# Patient Record
Sex: FEMALE | Race: WHITE
[De-identification: names, ages, dates, MRNs, and addresses within clinical notes are randomized per-mention and may not be internally consistent; named-entity substitution may affect disease eponyms.]

---

## 2017-08-16 ENCOUNTER — HOSPITAL ENCOUNTER (INPATIENT)
Dept: HOSPITAL 74 - JER | Age: 72
LOS: 7 days | Discharge: HOME HEALTH | DRG: 746 | End: 2017-08-23
Attending: INTERNAL MEDICINE | Admitting: INTERNAL MEDICINE
Payer: COMMERCIAL

## 2017-08-16 VITALS — BODY MASS INDEX: 28.8 KG/M2

## 2017-08-16 DIAGNOSIS — N17.9: ICD-10-CM

## 2017-08-16 DIAGNOSIS — G40.89: ICD-10-CM

## 2017-08-16 DIAGNOSIS — E11.65: ICD-10-CM

## 2017-08-16 DIAGNOSIS — B95.4: ICD-10-CM

## 2017-08-16 DIAGNOSIS — I25.10: ICD-10-CM

## 2017-08-16 DIAGNOSIS — N76.4: Primary | ICD-10-CM

## 2017-08-16 DIAGNOSIS — D64.9: ICD-10-CM

## 2017-08-16 DIAGNOSIS — E78.00: ICD-10-CM

## 2017-08-16 DIAGNOSIS — Z87.891: ICD-10-CM

## 2017-08-16 DIAGNOSIS — I11.0: ICD-10-CM

## 2017-08-16 DIAGNOSIS — I50.22: ICD-10-CM

## 2017-08-16 DIAGNOSIS — Z95.1: ICD-10-CM

## 2017-08-16 DIAGNOSIS — Z79.4: ICD-10-CM

## 2017-08-16 DIAGNOSIS — I25.5: ICD-10-CM

## 2017-08-16 LAB
ALBUMIN SERPL-MCNC: 3.5 G/DL (ref 3.4–5)
ALP SERPL-CCNC: 66 U/L (ref 45–117)
ALT SERPL-CCNC: 16 U/L (ref 12–78)
ANION GAP SERPL CALC-SCNC: 8 MMOL/L (ref 8–16)
AST SERPL-CCNC: 5 U/L (ref 15–37)
BASOPHILS # BLD: 0.2 % (ref 0–2)
BILIRUB SERPL-MCNC: 1 MG/DL (ref 0.2–1)
CALCIUM SERPL-MCNC: 10 MG/DL (ref 8.5–10.1)
CO2 SERPL-SCNC: 25 MMOL/L (ref 21–32)
CREAT SERPL-MCNC: 1.4 MG/DL (ref 0.55–1.02)
DEPRECATED RDW RBC AUTO: 13.4 % (ref 11.6–15.6)
EOSINOPHIL # BLD: 0.4 % (ref 0–4.5)
GLUCOSE SERPL-MCNC: 312 MG/DL (ref 74–106)
INR BLD: 1.27 (ref 0.82–1.09)
MCH RBC QN AUTO: 30.4 PG (ref 25.7–33.7)
MCHC RBC AUTO-ENTMCNC: 33.5 G/DL (ref 32–36)
MCV RBC: 90.6 FL (ref 80–96)
NEUTROPHILS # BLD: 80.6 % (ref 42.8–82.8)
PLATELET # BLD AUTO: 161 K/MM3 (ref 134–434)
PMV BLD: 8.4 FL (ref 7.5–11.1)
PROT SERPL-MCNC: 7.3 G/DL (ref 6.4–8.2)
PT PNL PPP: 14 SEC (ref 9.98–11.88)
WBC # BLD AUTO: 12.1 K/MM3 (ref 4–10)

## 2017-08-16 RX ADMIN — CARVEDILOL SCH MG: 12.5 TABLET, FILM COATED ORAL at 23:49

## 2017-08-16 NOTE — PDOC
*Physical Exam





- Vital Signs


 Last Vital Signs











Temp Pulse Resp BP Pulse Ox


 


 98.0 F   86   18   111/59   97 


 


 08/16/17 16:01  08/16/17 17:43  08/16/17 17:43  08/16/17 17:43  08/16/17 17:43














**Heart Score/ECG Review


  ** #1


ECG reviewed & interpreted by me at: 18:52


General ECG Interpretation: Sinus Rhythm, Normal Rate, Normal Intervals





08/16/17 18:52


non specific t wave abnormalities








ED Treatment Course





- LABORATORY


CBC & Chemistry Diagram: 


 08/16/17 18:35





 08/16/17 18:35





Medical Decision Making





- Medical Decision Making





08/16/17 18:39


This is a 72-year-old female with a history of diabetes who presents emergency 

department from her GYNs office due to labial abscess.


No fevers, no chills.


Area is very tender.


Plan is for admission for operative drainage tomorrow.








Pt seen by Midlevel Provider under my direct supervision


Ancillary studies reviewed





I agree with plan as outlined by Midlevel Provider





*DC/Admit/Observation/Transfer


Diagnosis at time of Disposition: 


 Abscess of labia majora





Diabetes


Qualifiers:


 Diabetes mellitus type: other specified (including TRACY) Diabetes mellitus 

complication status: with skin complications Diabetes mellitus complication 

detail: with other skin complication Diabetes mellitus long term insulin use: 

with long term use Qualified Code(s): E13.628 - Other specified diabetes 

mellitus with other skin complications





Coronary artery disease


Qualifiers:


 Coronary Disease-Associated Artery/Lesion type: unspecified vessel or lesion 

type Native vs. transplanted heart: native heart Associated angina: without 

angina Qualified Code(s): I25.10 - Atherosclerotic heart disease of native 

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





- Discharge Dispostion


Condition at time of disposition: Stable





- Referrals


Referrals: 


Yuly Beck MD [Primary Care Provider] - 





- Patient Instructions





- Post Discharge Activity

## 2017-08-16 NOTE — PDOC
History of Present Illness





- General


Chief Complaint: Wound


Stated Complaint: CYST (PCP SENT)


Time Seen by Provider: 08/16/17 17:28


History Source: Patient


Exam Limitations: No Limitations





- History of Present Illness


Initial Comments: 


08/16/17 17:53


Patient is a 72-year-old female with past medical history of insulin-dependent 

diabetes, HTN, HLD who presents to the emergency department today complaining 

of pain in her genital region. Patient states she was seen by Dr. Larry today 

in the clinic and was diagnosed with an abscess to her labia. She was sent over 

to the emergency department for further evaluation and workup of her abscess. 

She states her symptoms started yesterday. She initially noticed a small lump 

and that it hurt to sit. She states that the abscess grew overnight and became 

much larger. She states that the pain is in an 8 out of 10. Denies fevers, 

chills, weakness, lethargy, chest pain, shortness of breath, cough, edema, 

palpitations, nausea, vomiting and diarrhea. Patient states that she hasn't 

urinated since she noticed the abscess because she was afraid it was going to 

hurt.











Past History





- Travel


Traveled outside of the country in the last 30 days: No


Close contact w/someone who was outside of country & ill: No





- Past Medical History


Allergies/Adverse Reactions: 


 Allergies











Allergy/AdvReac Type Severity Reaction Status Date / Time


 


No Known Allergies Allergy   Verified 08/16/17 16:05











Home Medications: 


Ambulatory Orders





Atorvastatin Ca [Lipitor] 40 mg PO HS 07/22/16 


Carvedilol 12.5 mg PO BID 07/22/16 


Insulin Aspart [Novolog] 20 unit SQ TID 07/22/16 


Insulin Glargine,Hum.rec.anlog [Lantus Solostar PEN (NF)] 32 units SQ HS 07/22/ 16 


Ramipril 5 mg PO DAILY 07/22/16 


Spironolactone 25 mg PO DAILY 07/22/16 








Anemia: No


Asthma: No


Cancer: No


Cardiac Disorders: Yes


CVA: No


COPD: No


CHF: No


Dementia: No


Diabetes: Yes (IDDM)


GI Disorders: No


 Disorders: No


HTN: Yes


Hypercholesterolemia: Yes


Liver Disease: No


Seizures: Yes (possible with brain aneurysm (1992))


Thyroid Disease: No





- Surgical History


Cardiac Surgery: Yes (triple bypass 2016)


Neurologic Surgery: Yes (CLIPPING OF ANEURYSM 1992)





- Psycho/Social/Smoking Cessation Hx


Suicidal Ideation: No


Smoking Status: No


Smoking History: Former smoker


Have you smoked in the past 12 months: No


Number of Cigarettes Smoked Daily: 60


If you are a former smoker, when did you quit?: 1972


Information on smoking cessation initiated: No


Hx Alcohol Use: No


Drug/Substance Use Hx: No





**Review of Systems





- Review of Systems


Constitutional: No: Chills, Fever, Malaise, Weakness


Respiratory: No: Cough, Shortness of Breath, Wheezing


Cardiac (ROS): No: Chest Pain, Edema, Lightheadedness, Palpitations, Chest 

Tightness


ABD/GI: No: Diarrhea, Nausea, Vomiting


: Yes: Pain (L labia majora)


Integumentary: Yes: Erythema (L labia), Other (abscess to the L labia measuring 

2cm wide x 4cm long)


Neurological: No: Numbness, Tingling, Weakness


All Other Systems: Reviewed and Negative





*Physical Exam





- Vital Signs


 Last Vital Signs











Temp Pulse Resp BP Pulse Ox


 


 98.0 F   91 H  16   95/41   98 


 


 08/16/17 16:01  08/16/17 16:01  08/16/17 16:01  08/16/17 16:01  08/16/17 16:01














- Physical Exam


Comments: 





08/16/17 17:54


GENERAL:


Well developed, well nourished. AAOx3. No acute distress, laying on hospital bed

, breathing easily.


HEENT:


Normocephalic, atraumatic. PERRLA, EOMI. No conjunctival pallor. Sclera are non-

icteric. Moist mucous membranes. Oropharynx is clear.


NECK: 


Supple. Full ROM. No JVD. Carotid pulses 2+ and symmetric, without bruits. No 

thyromegaly. No lymphadenopathy.


CARDIOVASCULAR:


Regular rate and rhythm. No murmurs, rubs, or gallops. Distal pulses are 2+ and 

symmetric. 


PULMONARY: 


No evidence of respiratory distress. Lungs clear to auscultation bilaterally. 

No wheezing, rales or rhonchi.


ABDOMINAL:


Soft. Non-tender. Non-distended. No rebound or guarding. No organomegaly. 

Normoactive bowel sounds. 


MUSCULOSKELETAL 


Normal range of motion at all joints. No bony deformities or tenderness. No CVA 

tenderness.


EXTREMITIES: 


No cyanosis. No clubbing. No edema. No calf tenderness.


SKIN: 


Warm and dry. Normal capillary refill. No rashes. No jaundice. 


NEUROLOGICAL: 


Alert, awake, appropriate. Cranial nerves 2-12 intact. No deficits to light 

touch and temperature in face, upper extremities and lower extremities. No 

motor deficits in the in face, upper extremities and lower extremities. 

Normoreflexic in the upper and lower extremities. Normal speech. Toes are down-

going bilaterally. Gait is normal without ataxia.


PSYCHIATRIC: 


Cooperative. Good eye contact. Appropriate mood and affect.


:


L Labia with indurated abscess and surrounding erythema measuring 2cm [id number] 

long. TTP, no obvious fluctuant mass. R labia is normal appearing, no other 

rashes or lesions noted on the external genitalia 





Medical Decision Making





- Medical Decision Making


08/16/17 16:56





Patient is a 72-year-old female with past medical history of insulin-dependent 

diabetes, HTN, HLD who presents to the emergency department today complaining 

of pain in her genital region. On exam there is a cellulitic/abscess region on 

her left labia measuring approximately 2 cm x 4 cm. Patient saw Dr. Larry 

today in the office. Spoke with Dr. Chavez who is covering for Dr. Wheeler. 

She states that he is requesting that she be admitted for IV antibiotics and OR 

incision and drainage of the abscess given her comorbidities and location of 

abscess. Requesting that she be admitted to Dr. Beck her primary care doctor. 

Will page at this time. We will also order basic labs that are needed for the 

OR and IV antibiotics.





1. CBC, CMP, PT/INR, Type and screen, blood cultures x2, UA, UC


2. IV Vancomycin, IV zosyn


3. Admit to Med/Surg





08/16/17 17:11


Case discussed with Dr. Beck. He accepts the patient.





*DC/Admit/Observation/Transfer


Diagnosis at time of Disposition: 


 Abscess of labia majora





Diabetes


Qualifiers:


 Diabetes mellitus type: other specified (including TRACY) Diabetes mellitus 

complication status: with skin complications Diabetes mellitus complication 

detail: with other skin complication Diabetes mellitus long term insulin use: 

with long term use Qualified Code(s): E13.628 - Other specified diabetes 

mellitus with other skin complications





Coronary artery disease


Qualifiers:


 Coronary Disease-Associated Artery/Lesion type: unspecified vessel or lesion 

type Native vs. transplanted heart: native heart Associated angina: without 

angina Qualified Code(s): I25.10 - Atherosclerotic heart disease of native 

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





- Discharge Dispostion


Condition at time of disposition: Stable


Admit: Yes





- Referrals


Referrals: 


Yuly Beck MD [Primary Care Provider] -

## 2017-08-17 LAB
APPEARANCE UR: CLEAR
BILIRUB UR STRIP.AUTO-MCNC: NEGATIVE MG/DL
COLOR UR: (no result)
KETONES UR QL STRIP: NEGATIVE
LEUKOCYTE ESTERASE UR QL STRIP.AUTO: NEGATIVE
NITRITE UR QL STRIP: NEGATIVE
PH UR: 5 [PH] (ref 5–8)
PROT UR QL STRIP: (no result)
PROT UR QL STRIP: NEGATIVE
RBC # UR STRIP: NEGATIVE /UL
SP GR UR: <= 1.005 (ref 1–1.02)
UROBILINOGEN UR STRIP-MCNC: 0.2 MG/DL (ref 0.2–1)

## 2017-08-17 PROCEDURE — 0U9MXZZ DRAINAGE OF VULVA, EXTERNAL APPROACH: ICD-10-PCS | Performed by: OBSTETRICS & GYNECOLOGY

## 2017-08-17 RX ADMIN — CARVEDILOL SCH: 12.5 TABLET, FILM COATED ORAL at 09:46

## 2017-08-17 RX ADMIN — INSULIN ASPART SCH UNITS: 100 INJECTION, SOLUTION INTRAVENOUS; SUBCUTANEOUS at 01:02

## 2017-08-17 RX ADMIN — INSULIN ASPART SCH: 100 INJECTION, SOLUTION INTRAVENOUS; SUBCUTANEOUS at 06:48

## 2017-08-17 RX ADMIN — ACETAMINOPHEN PRN MG: 325 TABLET ORAL at 22:25

## 2017-08-17 RX ADMIN — CARVEDILOL SCH MG: 12.5 TABLET, FILM COATED ORAL at 21:42

## 2017-08-17 RX ADMIN — INSULIN ASPART SCH UNITS: 100 INJECTION, SOLUTION INTRAVENOUS; SUBCUTANEOUS at 21:41

## 2017-08-17 RX ADMIN — PIPERACILLIN SODIUM,TAZOBACTAM SODIUM SCH MLS/HR: 3; .375 INJECTION, POWDER, FOR SOLUTION INTRAVENOUS at 18:36

## 2017-08-17 RX ADMIN — INSULIN ASPART SCH: 100 INJECTION, SOLUTION INTRAVENOUS; SUBCUTANEOUS at 11:52

## 2017-08-17 NOTE — EKG
Test Reason : 

Blood Pressure : ***/*** mmHG

Vent. Rate : 082 BPM     Atrial Rate : 082 BPM

   P-R Int : 162 ms          QRS Dur : 086 ms

    QT Int : 346 ms       P-R-T Axes : -08 -17 069 degrees

   QTc Int : 404 ms

 

NORMAL SINUS RHYTHM

NONSPECIFIC T WAVE ABNORMALITY

ABNORMAL ECG

WHEN COMPARED WITH ECG OF 23-JUL-2016 22:13,

QT HAS SHORTENED

Confirmed by KEYONA BARNETT MD ([id number]) on 8/17/2017 2:55:38 PM

 

Referred By:             Confirmed By:KEYONA BARNETT MD

## 2017-08-17 NOTE — CON.OBG
Consult


Consult Specialty:: gyn


Reason for Consultation:: DR Beck





- History of Present Illness


Chief Complaint: vulvar pain


History of Present Illness: 


73 yo f with hx of DM, ASHD , c/o LT vulvar pain for 4 days , admitted for 

large LT vulvar abscess, no discharge, no fever





- History Source


History Provided By: Patient


Limitations to Obtaining History: No Limitations





- Past Medical History


Cardio/Vascular: Yes: CAD, CHF, HTN


...Pregnant: No





- Past Surgical History


Past Surgical History: Yes: Bypass





- Alcohol/Substance Use


Hx Alcohol Use: No





- Smoking History


Smoking history: Former smoker


Have you smoked in the past 12 months: No


Aproximately how many cigarettes per day: 60


If you are a former smoker, when did you quit?: 1972





- Social History


Usual Living Arrangement: With Spouse





Home Medications





- Allergies


Allergies/Adverse Reactions: 


 Allergies











Allergy/AdvReac Type Severity Reaction Status Date / Time


 


No Known Allergies Allergy   Verified 08/16/17 16:05














- Home Medications


Home Medications: 


Ambulatory Orders





Atorvastatin Ca [Lipitor] 40 mg PO HS 07/22/16 


Carvedilol 12.5 mg PO BID 07/22/16 


Insulin Aspart [Novolog] 14 unit SQ TID 07/22/16 


Insulin Glargine,Hum.rec.anlog [Lantus Solostar PEN (NF)] 32 units SQ HS 07/22/ 16 


Ramipril 5 mg PO DAILY 07/22/16 


Spironolactone 25 mg PO DAILY 07/22/16 











Review of Systems





- Review of Systems


Constitutional: reports: Weakness


HENT: reports: No Symptoms


Neck: reports: No Symptoms


Cardiovascular: reports: No Symptoms


Respiratory: reports: No Symptoms


Gastrointestinal: reports: No Symptoms


Genitourinary: reports: Pain


Breasts: reports: No Symptoms Reported


Musculoskeletal: reports: No Symptoms


Neurological: reports: No Symptoms


Endocrine: reports: No Symptoms


Hematology/Lymphatic: reports: No Symptoms


Pain Intensity: 8





Physical Exam-GYN


Vital Signs: 


 Vital Signs











Temperature  98.4 F   08/17/17 18:30


 


Pulse Rate  75   08/17/17 18:30


 


Respiratory Rate  20   08/17/17 18:30


 


Blood Pressure  130/60   08/17/17 18:30


 


O2 Sat by Pulse Oximetry (%)  100   08/17/17 17:45











Constitutional: Yes: Well Nourished, No Distress, Calm


Eyes: Yes: WNL, Conjunctiva Clear, EOM Intact


HENT: Yes: WNL, Atraumatic, Normocephalic


Neck: Yes: WNL, Supple, Trachea Midline


Cardiovascular: Yes: WNL, Regular Rate and Rhythm


Respiratory: Yes: WNL, Regular, CTA Bilaterally


Gastrointestinal: Yes: WNL


Renal/: Yes: WNL


External Genitalia: Yes: Other (large LT labia majora abscess 6 cm, red, with 

cellulitis and tenderness)


Vaginal Exam: Yes: Normal


Breast(s): Yes: WNL


Musculoskeletal: Yes: WNL


Extremities: Yes: WNL


Integumentary: Yes: WNL


Neurological: Yes: WNL, Alert, Oriented


...Motor Strength: WNL


Psychiatric: Yes: WNL, Alert, Oriented


Labs: 


 CBC, BMP





 08/16/17 18:35 





 08/16/17 23:38 











Problem List





- Problems


(1) Abscess of left genital labia


Code(s): N76.4 - ABSCESS OF VULVA








Assessment/Plan


iv antibiotics, I/D of abscess , rba discussed

## 2017-08-17 NOTE — CONSULT
Consult


Consult Specialty:: infectious diseases


Referred by:: 


Reason for Consultation:: labial abscess





- History of Present Illness


Chief Complaint: pain and swelling of the left labia


History of Present Illness: 


This is a 72-year-old female with a history of diabetes who is admitted for 

labial abscess.


patient had the abscess before


patient was evalauted by gyn and the plan is to do surgery today


patient c/o sever pain at the site


spoke with the family and her sugars are not in control





- History Source


History Provided By: Patient, Family Member





- Past Medical History


Cardio/Vascular: Yes: CAD, CHF, HTN


...Pregnant: No





- Alcohol/Substance Use


Hx Alcohol Use: No





- Smoking History


Smoking history: Former smoker


Have you smoked in the past 12 months: No


Aproximately how many cigarettes per day: 60


If you are a former smoker, when did you quit?: 1972





Home Medications





- Allergies


Allergies/Adverse Reactions: 


 Allergies











Allergy/AdvReac Type Severity Reaction Status Date / Time


 


No Known Allergies Allergy   Verified 08/16/17 16:05














- Home Medications


Home Medications: 


Ambulatory Orders





Atorvastatin Ca [Lipitor] 40 mg PO HS 07/22/16 


Carvedilol 12.5 mg PO BID 07/22/16 


Insulin Aspart [Novolog] 14 unit SQ TID 07/22/16 


Insulin Glargine,Hum.rec.anlog [Lantus Solostar PEN (NF)] 32 units SQ HS 07/22/ 16 


Ramipril 5 mg PO DAILY 07/22/16 


Spironolactone 25 mg PO DAILY 07/22/16 











Review of Systems





- Review of Systems


Constitutional: reports: No Symptoms


Eyes: reports: No Symptoms


HENT: reports: No Symptoms


Neck: reports: No Symptoms


Cardiovascular: reports: No Symptoms


Respiratory: reports: No Symptoms


Gastrointestinal: reports: No Symptoms


Genitourinary: reports: No Symptoms


Musculoskeletal: reports: No Symptoms


Integumentary: reports: Erythema, Wound


Neurological: reports: No Symptoms


Endocrine: reports: No Symptoms


Hematology/Lymphatic: reports: No Symptoms


Psychiatric: reports: No Symptoms





Physical Exam


Vital Signs: 


 Vital Signs











Temperature  97.8 F   08/17/17 10:00


 


Pulse Rate  74   08/17/17 10:00


 


Respiratory Rate  18   08/17/17 10:00


 


Blood Pressure  100/40   08/17/17 10:00


 


O2 Sat by Pulse Oximetry (%)  99   08/16/17 21:00











Constitutional: Yes: Calm, Mild Distress


Eyes: Yes: Conjunctiva Clear


HENT: Yes: Atraumatic


Neck: Yes: Supple


Cardiovascular: Yes: Regular Rate and Rhythm


Respiratory: Yes: Regular, CTA Bilaterally


Gastrointestinal: Yes: Normal Bowel Sounds, Soft


Renal/: Yes: Other (left labia tenderness swelling)


Musculoskeletal: Yes: WNL


Extremities: Yes: WNL


Neurological: Yes: Alert, Oriented


Psychiatric: Yes: Alert, Oriented


Labs: 


 CBC, BMP





 08/16/17 18:35 





 08/16/17 23:38 











Imaging





- Results


Chest X-ray: Report Reviewed, Image Reviewed





Assessment/Plan


patient evaluated


patient has labial abscess





left labial abscess


dm





plan


surgery


will continue zosyn


once surgery is done


will see what cx show


diabetes management

## 2017-08-17 NOTE — HP
DATE OF ADMISSION:  08/16/2017

 

DATE OF DICTATION:  08/17/2017

 

HISTORY OF PRESENT ILLNESS:  This is a 72-year-old female known to have hypertension

and diabetes not under good control all the time, now presenting to the emergency

room with complaints of pain in the vulvar area.  It was diagnosed that patient had

labial abscess on the left side evaluated by her gynecologist, Dr. Fernandes, advised

incision and drainage in the operating room.  So, she got admitted with a diagnosis

of uncontrolled diabetes and abscess in the vulvar area of the vagina.  

 

MEDICATIONS:  She is on insulin Lantus 32 units a day and also on regular insulin 3

times a day depending upon the blood sugar.  

 

SOCIAL HISTORY:  She is not a smoker.  No alcohol abuse.  She is still working as a

[employer].  

 

ALLERGIES:  No known allergies.  

 

PHYSICAL EXAMINATION:

Vital signs:  Today, her blood pressure is 110/70, pulse 72, respirations 20,

temperature 100.2.  

HEENT:  Unremarkable.  

Neck:  supple.

Lungs:  Clear.  

Breasts:  No masses.

Heart:  S1, S2 normal.  No S3, S4.  

Abdomen:  Soft, nontender.  On the vaginal area, there is an abscess with induration

on the left labia.  

Extremities:  Legs no edema.  

Neurologic:  Grossly normal.  

 

LABORATORIES:  WBC 12, hemoglobin 11, hematocrit 34, platelets 161.  Chemistry: 

Sodium 133, potassium 4.7, BUN 43, creatinine 1.4, blood sugar 312 at the time of

admission and this morning is 191.  

 

Chest x-ray showed no acute changes, prominent mediastinum.  EKG to be evaluated.  

 

IMPRESSION:  Labial abscess, uncontrolled diabetes.  

 

PLAN:  GYN consult with Dr. Fernandes to do incision and drainage, and ID consult , Dr. Abbott, for antibiotics.  

 

 

ETHAN [id number]

DD: 08/17/2017 08:57

DT: 08/17/2017 11:04

Job #:  [id number]

## 2017-08-18 LAB
DEPRECATED RDW RBC AUTO: 13.2 % (ref 11.6–15.6)
MCH RBC QN AUTO: 29.9 PG (ref 25.7–33.7)
MCHC RBC AUTO-ENTMCNC: 33.6 G/DL (ref 32–36)
MCV RBC: 89 FL (ref 80–96)
PLATELET # BLD AUTO: 142 K/MM3 (ref 134–434)
PMV BLD: 8.3 FL (ref 7.5–11.1)
WBC # BLD AUTO: 13.8 K/MM3 (ref 4–10)

## 2017-08-18 RX ADMIN — PIPERACILLIN SODIUM,TAZOBACTAM SODIUM SCH MLS/HR: 3; .375 INJECTION, POWDER, FOR SOLUTION INTRAVENOUS at 18:25

## 2017-08-18 RX ADMIN — INSULIN ASPART SCH: 100 INJECTION, SOLUTION INTRAVENOUS; SUBCUTANEOUS at 06:02

## 2017-08-18 RX ADMIN — INSULIN ASPART SCH UNITS: 100 INJECTION, SOLUTION INTRAVENOUS; SUBCUTANEOUS at 11:20

## 2017-08-18 RX ADMIN — POTASSIUM CHLORIDE AND SODIUM CHLORIDE SCH MLS/HR: 900; 150 INJECTION, SOLUTION INTRAVENOUS at 15:06

## 2017-08-18 RX ADMIN — RAMIPRIL SCH: 5 CAPSULE ORAL at 09:49

## 2017-08-18 RX ADMIN — CARVEDILOL SCH: 12.5 TABLET, FILM COATED ORAL at 09:50

## 2017-08-18 RX ADMIN — INSULIN ASPART SCH UNITS: 100 INJECTION, SOLUTION INTRAVENOUS; SUBCUTANEOUS at 18:25

## 2017-08-18 RX ADMIN — CLINDAMYCIN HYDROCHLORIDE SCH MG: 150 CAPSULE ORAL at 18:25

## 2017-08-18 RX ADMIN — CARVEDILOL SCH MG: 12.5 TABLET, FILM COATED ORAL at 21:24

## 2017-08-18 RX ADMIN — PIPERACILLIN SODIUM,TAZOBACTAM SODIUM SCH MLS/HR: 3; .375 INJECTION, POWDER, FOR SOLUTION INTRAVENOUS at 09:48

## 2017-08-18 RX ADMIN — ACETAMINOPHEN PRN MG: 325 TABLET ORAL at 06:05

## 2017-08-18 RX ADMIN — INSULIN ASPART SCH: 100 INJECTION, SOLUTION INTRAVENOUS; SUBCUTANEOUS at 21:39

## 2017-08-18 RX ADMIN — PIPERACILLIN SODIUM,TAZOBACTAM SODIUM SCH MLS/HR: 3; .375 INJECTION, POWDER, FOR SOLUTION INTRAVENOUS at 02:05

## 2017-08-18 RX ADMIN — SPIRONOLACTONE SCH: 25 TABLET, FILM COATED ORAL at 09:49

## 2017-08-18 NOTE — PN
Progress Note, Physician


Chief Complaint: 


C/o Pain 


History of Present Illness: 


72 yrs with uncontro;lled T2DM, CAD, CHF admitted with uncontrolled DM and Left

  Labial ulcer underwent I and D under [facility]





- Current Medication List


Current Medications: 


Active Medications





Acetaminophen (Tylenol -)  650 mg PO Q6H PRN


   PRN Reason: FEVER OR PAIN


   Last Admin: 08/18/17 06:05 Dose:  650 mg


Carvedilol (Coreg -)  12.5 mg PO BID [facility]


   Last Admin: 08/17/17 21:42 Dose:  12.5 mg


Fentanyl (Sublimaze Injection -)  50 mcg IVPUSH [id number] PRN


   PRN Reason: PAIN


   Stop: 08/20/17 16:53


   Last Admin: 08/17/17 17:15 Dose:  50 mcg


Parenteral Electrolytes (Plasma-Lyte 148 -)  1,000 mls @ 50 mls/hr IV ASDIR [facility]


   Last Admin: 08/17/17 18:00 Dose:  0 mls


Piperacillin Sod/Tazobactam (Sod 3.375 gm/ Dextrose)  50 mls @ 100 mls/hr IVPB 

Q8H-IV MYKE


   PRN Reason: Protocol


   Last Admin: 08/18/17 02:05 Dose:  100 mls/hr


Insulin Aspart (Novolog Vial Sliding Scale -)  1 vial SQ ACHS [facility]


   PRN Reason: Protocol


   Last Admin: 08/18/17 06:02 Dose:  Not Given


Insulin Detemir (Levemir Vial)  36 units SQ HS [facility]


Morphine Sulfate (Morphine Injection -)  2 mg IVPUSH Q6H PRN


   PRN Reason: PAIN


Ondansetron HCl (Zofran Injection)  4 mg IVPB Q6H PRN


   PRN Reason: NAUSEA


Oxycodone HCl (Roxicodone -)  5 mg PO Q4H PRN


   PRN Reason: PAIN


Ramipril (Altace -)  5 mg PO DAILY [facility]


Spironolactone (Aldactone -)  25 mg PO DAILY [facility]











- Objective


Vital Signs: 


 Vital Signs











Temperature  101.2 F H  08/18/17 06:00


 


Pulse Rate  83   08/18/17 06:00


 


Respiratory Rate  20   08/18/17 06:00


 


Blood Pressure  100/46   08/18/17 06:00


 


O2 Sat by Pulse Oximetry (%)  98   08/17/17 21:00











General: Elderly F not in distress c/o less pain





HEENT: ET tube at place, MM moist, PERRLA,





NECK; NO NVD, No Bruit, Carotids +





CHEST: B/L equal AE





CVS: S1 S2 R no m/g/r





ABD: No distention non tender





; Left labial abscess I and D





EXT: ,Trace edema feet, no calf tenderness, Pulses +





CNS: AOX3 non focal


Labs: 


 CBC, BMP





 08/16/17 23:38 





 INR, PTT











INR  1.27  (0.82-1.09)  H  08/16/17  18:35    














Problem List





- Problems


(1) Abscess of left genital labia


Assessment/Plan: 


S/P I a D Cont IV abx  Zosyn F/U culture result,  post Op care as per GYN


Code(s): N76.4 - ABSCESS OF VULVA





(2) HTN (hypertension)


Assessment/Plan: 


Well controlled cont current meds


Code(s): I10 - ESSENTIAL (PRIMARY) HYPERTENSION   Qualifiers: 


     Hypertension type: essential hypertension        Qualified Code(s): I10 - 

Essential (primary) hypertension  





(3) Uncontrolled diabetes mellitus


Assessment/Plan: 


Diabetic Diet optimize Glycemic control, increase Lantus dose.


Code(s): E11.65 - TYPE 2 DIABETES MELLITUS WITH HYPERGLYCEMIA   





(4) Coronary artery disease


Assessment/Plan: 


Stables/p CABG  no active issue.


Code(s): I25.10 - ATHSCL HEART DISEASE OF NATIVE CORONARY ARTERY W/O ANG PCTRS 

  Qualifiers: 


     Coronary Disease-Associated Artery/Lesion type: unspecified vessel or 

lesion type     Native vs. transplanted heart: native heart     Associated 

angina: without angina        Qualified Code(s): I25.10 - Atherosclerotic heart 

disease of native coronary artery without angina pectoris  





(5) NYHA class 3 heart failure with reduced ejection fraction


Assessment/Plan: 


Ischemic Cardiomyopathy with reduced Ef at present compensated cont Coreg, 

aldactone and ramipril.


Code(s): I50.9 - HEART FAILURE, UNSPECIFIED





(6) Anemia


Assessment/Plan: 


F/U anemia w/u.


Code(s): D64.9 - ANEMIA, UNSPECIFIED

## 2017-08-18 NOTE — PN
Progress Note, Physician


History of Present Illness: 


doing well


had couple of spikes of fever





- Current Medication List


Current Medications: 


Active Medications





Acetaminophen (Tylenol -)  650 mg PO Q6H PRN


   PRN Reason: FEVER OR PAIN


   Last Admin: 08/18/17 06:05 Dose:  650 mg


Carvedilol (Coreg -)  12.5 mg PO BID [facility]


   Last Admin: 08/18/17 09:50 Dose:  Not Given


Fentanyl (Sublimaze Injection -)  50 mcg IVPUSH [id number] PRN


   PRN Reason: PAIN


   Stop: 08/20/17 16:53


   Last Admin: 08/17/17 17:15 Dose:  50 mcg


Piperacillin Sod/Tazobactam (Sod 3.375 gm/ Dextrose)  50 mls @ 100 mls/hr IVPB 

Q8H-IV MYKE


   PRN Reason: Protocol


   Last Admin: 08/18/17 09:48 Dose:  100 mls/hr


Potassium Chloride/Sodium Chloride (Ns+20 Meq Kcl -)  1,000 mls @ 75 mls/hr IV 

ASDIR [facility]


   Last Admin: 08/18/17 15:06 Dose:  75 mls/hr


Insulin Aspart (Novolog Vial Sliding Scale -)  1 vial SQ ACHS [facility]


   PRN Reason: Protocol


   Last Admin: 08/18/17 11:20 Dose:  8 units


Insulin Detemir (Levemir Vial)  36 units SQ HS [facility]


Morphine Sulfate (Morphine Injection -)  2 mg IVPUSH Q6H PRN


   PRN Reason: PAIN


Ondansetron HCl (Zofran Injection)  4 mg IVPB Q6H PRN


   PRN Reason: NAUSEA


Oxycodone HCl (Roxicodone -)  5 mg PO Q4H PRN


   PRN Reason: PAIN


Ramipril (Altace -)  5 mg PO DAILY [facility]


   Last Admin: 08/18/17 09:49 Dose:  Not Given


Spironolactone (Aldactone -)  25 mg PO DAILY [facility]


   Last Admin: 08/18/17 09:49 Dose:  Not Given











- Objective


Vital Signs: 


 Vital Signs











Temperature  98.8 F   08/18/17 15:50


 


Pulse Rate  72   08/18/17 15:50


 


Respiratory Rate  18   08/18/17 15:50


 


Blood Pressure  98/34   08/18/17 15:50


 


O2 Sat by Pulse Oximetry (%)  98   08/17/17 21:00











Constitutional: Yes: No Distress, Calm


Cardiovascular: Yes: Regular Rate and Rhythm


Respiratory: Yes: Regular, CTA Bilaterally


Gastrointestinal: Yes: Normal Bowel Sounds, Soft


Musculoskeletal: Yes: WNL


Extremities: Yes: WNL


Wound/Incision: Yes: Dressing Dry and Intact


Neurological: Yes: Alert, Oriented


Psychiatric: Yes: Alert, Oriented


Labs: 


 CBC, BMP





 08/18/17 08:30 





 08/16/17 23:38 





 INR, PTT











INR  1.27  (0.82-1.09)  H  08/16/17  18:35    














Assessment/Plan


patient evaluated


patient has labial abscess





left labial abscess


dm





plan


surgery


will continue zosyn


will add clinda


await for gram cx

## 2017-08-18 NOTE — PN
Progress Note (short form)





- Note


Progress Note: 


Anesthesia POD#1


S/P Labial abscess drainage under TIVA


She is stable having fever. No N/V. Pain is under control.


No complications to anesthesia seen.





Sharda Reyes MD.

## 2017-08-18 NOTE — OP
DATE OF OPERATION:  08/17/2017

 

PREOPERATIVE DIAGNOSIS:  Left vulvar abscess.  

 

POSTOPERATIVE DIAGNOSIS:  Left vulvar abscess.  

 

PROCEDURE:  Incision and drainage of the left vulvar abscess.  

 

SURGEON:  Danny Soares MD

 

ANESTHESIA:  General.

 

ANESTHESIOLOGIST:  Susanna Bustos DO

 

ESTIMATED BLOOD LOSS:  50 mL.   

 

OPERATION:  The patient was taken to the operating room, had adequate general

anesthesia, in dorsal lithotomy position.  Examination under anesthesia revealed  an

approximately 6-cm large labia majora abscess with cellulitis around the vulvar area.

 Then, the area was cleaned with Betadine, and then, with the knife, a 3-cm incision

was made, and 30 mL of pus was drained.  Then, with a Emilee clamp, the loculation of

the abscess was lysed and more abscess drained, and then, the abscess cavity was

packed with Iodoform gauze.  Patient tolerated the procedure well, left the OR in

good condition.  

 

 

DANNY SOARES M.D. [id number]

DD: 08/17/2017 22:10

DT: 08/18/2017 08:38

Job #:  [id number]

## 2017-08-19 LAB
ALBUMIN SERPL-MCNC: 2.6 G/DL (ref 3.4–5)
ALP SERPL-CCNC: 54 U/L (ref 45–117)
ALT SERPL-CCNC: 15 U/L (ref 12–78)
ANION GAP SERPL CALC-SCNC: 9 MMOL/L (ref 8–16)
AST SERPL-CCNC: 12 U/L (ref 15–37)
BASOPHILS # BLD: 0.6 % (ref 0–2)
BILIRUB SERPL-MCNC: 0.9 MG/DL (ref 0.2–1)
CALCIUM SERPL-MCNC: 7.6 MG/DL (ref 8.5–10.1)
CO2 SERPL-SCNC: 23 MMOL/L (ref 21–32)
CREAT SERPL-MCNC: 1 MG/DL (ref 0.55–1.02)
DEPRECATED RDW RBC AUTO: 13.2 % (ref 11.6–15.6)
EOSINOPHIL # BLD: 2.7 % (ref 0–4.5)
GLUCOSE SERPL-MCNC: 108 MG/DL (ref 74–106)
MCH RBC QN AUTO: 30.5 PG (ref 25.7–33.7)
MCHC RBC AUTO-ENTMCNC: 33.6 G/DL (ref 32–36)
MCV RBC: 90.8 FL (ref 80–96)
NEUTROPHILS # BLD: 70.9 % (ref 42.8–82.8)
PLATELET # BLD AUTO: 168 K/MM3 (ref 134–434)
PMV BLD: 8.7 FL (ref 7.5–11.1)
PROT SERPL-MCNC: 5.8 G/DL (ref 6.4–8.2)
WBC # BLD AUTO: 11 K/MM3 (ref 4–10)

## 2017-08-19 RX ADMIN — CLINDAMYCIN HYDROCHLORIDE SCH MG: 150 CAPSULE ORAL at 00:01

## 2017-08-19 RX ADMIN — INSULIN ASPART SCH UNITS: 100 INJECTION, SOLUTION INTRAVENOUS; SUBCUTANEOUS at 21:58

## 2017-08-19 RX ADMIN — SPIRONOLACTONE SCH: 25 TABLET, FILM COATED ORAL at 09:08

## 2017-08-19 RX ADMIN — CLINDAMYCIN HYDROCHLORIDE SCH MG: 150 CAPSULE ORAL at 06:01

## 2017-08-19 RX ADMIN — INSULIN ASPART SCH UNITS: 100 INJECTION, SOLUTION INTRAVENOUS; SUBCUTANEOUS at 16:47

## 2017-08-19 RX ADMIN — CLINDAMYCIN HYDROCHLORIDE SCH MG: 150 CAPSULE ORAL at 23:29

## 2017-08-19 RX ADMIN — PIPERACILLIN SODIUM,TAZOBACTAM SODIUM SCH MLS/HR: 3; .375 INJECTION, POWDER, FOR SOLUTION INTRAVENOUS at 17:58

## 2017-08-19 RX ADMIN — CLINDAMYCIN HYDROCHLORIDE SCH MG: 150 CAPSULE ORAL at 17:57

## 2017-08-19 RX ADMIN — CARVEDILOL SCH: 12.5 TABLET, FILM COATED ORAL at 09:08

## 2017-08-19 RX ADMIN — CARVEDILOL SCH MG: 12.5 TABLET, FILM COATED ORAL at 21:57

## 2017-08-19 RX ADMIN — INSULIN ASPART SCH UNITS: 100 INJECTION, SOLUTION INTRAVENOUS; SUBCUTANEOUS at 11:45

## 2017-08-19 RX ADMIN — PIPERACILLIN SODIUM,TAZOBACTAM SODIUM SCH MLS/HR: 3; .375 INJECTION, POWDER, FOR SOLUTION INTRAVENOUS at 01:54

## 2017-08-19 RX ADMIN — INSULIN ASPART SCH: 100 INJECTION, SOLUTION INTRAVENOUS; SUBCUTANEOUS at 06:13

## 2017-08-19 RX ADMIN — POTASSIUM CHLORIDE AND SODIUM CHLORIDE SCH MLS/HR: 900; 150 INJECTION, SOLUTION INTRAVENOUS at 14:20

## 2017-08-19 RX ADMIN — POTASSIUM CHLORIDE AND SODIUM CHLORIDE SCH: 900; 150 INJECTION, SOLUTION INTRAVENOUS at 11:00

## 2017-08-19 RX ADMIN — INSULIN DETEMIR SCH UNITS: 100 INJECTION, SOLUTION SUBCUTANEOUS at 21:57

## 2017-08-19 RX ADMIN — PIPERACILLIN SODIUM,TAZOBACTAM SODIUM SCH MLS/HR: 3; .375 INJECTION, POWDER, FOR SOLUTION INTRAVENOUS at 09:40

## 2017-08-19 RX ADMIN — RAMIPRIL SCH: 5 CAPSULE ORAL at 09:08

## 2017-08-19 RX ADMIN — CLINDAMYCIN HYDROCHLORIDE SCH MG: 150 CAPSULE ORAL at 11:45

## 2017-08-19 NOTE — PN
Progress Note (short form)





- Note


Progress Note: 


pod 2 still has pain at site of abscess , draining serosangous discharge , no 

foul odor


LT vulva  swelling and redness has decreased significantly. packing removed


 CBC, BMP





 08/18/17 08:30 





 08/16/17 23:38 





 Last Vital Signs











Temp Pulse Resp BP Pulse Ox


 


 98.6 F   78   22   96/40   98 


 


 08/19/17 09:07  08/19/17 09:07  08/19/17 09:07  08/19/17 09:07  08/18/17 21:00








plan when medically stable , can be discharge home with po antibiotics as per 

ID 





Problem List





- Problems


(1) Abscess of left genital labia


Code(s): N76.4 - ABSCESS OF VULVA

## 2017-08-19 NOTE — PN
Progress Note, Physician


Chief Complaint: 


C/o Pain 


History of Present Illness: 


72 yrs with uncontrolled T2DM, CAD, CHF admitted with uncontrolled DM and 

Labial ulcer underwent I and D under [facility]





- Current Medication List


Current Medications: 


Active Medications





Acetaminophen (Tylenol -)  650 mg PO Q6H PRN


   PRN Reason: FEVER OR PAIN


   Last Admin: 08/18/17 06:05 Dose:  650 mg


Carvedilol (Coreg -)  12.5 mg PO BID [facility]


   Last Admin: 08/19/17 09:08 Dose:  Not Given


Clindamycin HCl (Cleocin -)  300 mg PO Q6HPO [facility]


   Last Admin: 08/19/17 11:45 Dose:  300 mg


Fentanyl (Sublimaze Injection -)  50 mcg IVPUSH [id number] PRN


   PRN Reason: PAIN


   Stop: 08/20/17 16:53


   Last Admin: 08/17/17 17:15 Dose:  50 mcg


Piperacillin Sod/Tazobactam (Sod 3.375 gm/ Dextrose)  50 mls @ 100 mls/hr IVPB 

Q8H-IV [facility]


   PRN Reason: Protocol


   Last Admin: 08/19/17 09:40 Dose:  100 mls/hr


Potassium Chloride/Sodium Chloride (Ns+20 Meq Kcl -)  1,000 mls @ 75 mls/hr IV 

ASDIR [facility]


   Last Admin: 08/18/17 15:06 Dose:  75 mls/hr


Insulin Aspart (Novolog Vial Sliding Scale -)  1 vial SQ ACHS [facility]


   PRN Reason: Protocol


   Last Admin: 08/19/17 11:45 Dose:  4 units


Insulin Detemir (Levemir Vial)  36 units SQ HS [facility]


   Last Admin: 08/18/17 21:39 Dose:  36 units


Morphine Sulfate (Morphine Injection -)  2 mg IVPUSH Q6H PRN


   PRN Reason: PAIN


Ondansetron HCl (Zofran Injection)  4 mg IVPB Q6H PRN


   PRN Reason: NAUSEA


Oxycodone HCl (Roxicodone -)  5 mg PO Q4H PRN


   PRN Reason: PAIN


   Last Admin: 08/19/17 02:46 Dose:  5 mg


Ramipril (Altace -)  5 mg PO DAILY [facility]


   Last Admin: 08/19/17 09:08 Dose:  Not Given


Spironolactone (Aldactone -)  25 mg PO DAILY [facility]


   Last Admin: 08/19/17 09:08 Dose:  Not Given











- Objective


Vital Signs: 


 Vital Signs











Temperature  98.6 F   08/19/17 09:07


 


Pulse Rate  78   08/19/17 09:07


 


Respiratory Rate  22   08/19/17 09:07


 


Blood Pressure  96/40   08/19/17 09:07


 


O2 Sat by Pulse Oximetry (%)  98   08/18/17 21:00








General: Elderly F not in distress c/o less pain





HEENT: ET tube at place, MM moist, PERRLA,





NECK; NO NVD, No Bruit, Carotids +





CHEST: B/L equal AE





CVS: S1 S2 R no m/g/r





ABD: No distention non tender





; Left labial abscess I and D





EXT: ,Trace edema feet, no calf tenderness, Pulses +


Labs: 


 CBC, BMP





 08/18/17 08:30 





 08/16/17 23:38 





 INR, PTT











INR  1.27  (0.82-1.09)  H  08/16/17  18:35    








 Microbiology





08/17/17 20:00   Vulva   Gram Stain - Final Grew Strept sensitive to 

Levofloxacin.


08/17/17 00:10   Urine - Urine Clean Catch   Urine Culture - Final


                            NO GROWTH OBTAINED


08/16/17 18:45   Blood - Peripheral Venous   Blood Culture - Preliminary


                            NO GROWTH OBTAINED AFTER 24 HOURS, INCUBATION TO 

CONTINUE


                            FOR 4 DAYS.


08/16/17 18:45   Blood - Peripheral Venous   Blood Culture - Preliminary


                            NO GROWTH OBTAINED AFTER 24 HOURS, INCUBATION TO 

CONTINUE


                            FOR 4 DAYS.











Problem List





- Problems


(1) Abscess of left genital labia


Assessment/Plan: 


 nGrew Strept will DC Clinda can be switched to Levofloxacin


Code(s): N76.4 - ABSCESS OF VULVA





(2) HTN (hypertension)


Code(s): I10 - ESSENTIAL (PRIMARY) HYPERTENSION   Qualifiers: 


     Hypertension type: essential hypertension        Qualified Code(s): I10 - 

Essential (primary) hypertension  





(3) Uncontrolled diabetes mellitus


Assessment/Plan: 


Diabetic Diet optimize Glycemic control increase Levimir dose


Code(s): E11.65 - TYPE 2 DIABETES MELLITUS WITH HYPERGLYCEMIA   





(4) Coronary artery disease


Assessment/Plan: 


Stable no active issue


Code(s): I25.10 - ATHSCL HEART DISEASE OF NATIVE CORONARY ARTERY W/O ANG PCTRS 

  Qualifiers: 


     Coronary Disease-Associated Artery/Lesion type: unspecified vessel or 

lesion type     Native vs. transplanted heart: native heart     Associated 

angina: without angina        Qualified Code(s): I25.10 - Atherosclerotic heart 

disease of native coronary artery without angina pectoris  





(5) NYHA class 3 heart failure with reduced ejection fraction


Assessment/Plan: 


Ischemic Cardiomyopathy with reduced Ef at present compensated


Code(s): I50.9 - HEART FAILURE, UNSPECIFIED

## 2017-08-19 NOTE — PN
Progress Note, Physician


History of Present Illness: 


Pt without specific complaints. Pain controlled with medication. No fever/chills





- Current Medication List


Current Medications: 


Active Medications





Acetaminophen (Tylenol -)  650 mg PO Q6H PRN


   PRN Reason: FEVER OR PAIN


   Last Admin: 08/18/17 06:05 Dose:  650 mg


Carvedilol (Coreg -)  12.5 mg PO BID [facility]


   Last Admin: 08/19/17 09:08 Dose:  Not Given


Clindamycin HCl (Cleocin -)  300 mg PO Q6HPO [facility]


   Last Admin: 08/19/17 11:45 Dose:  300 mg


Fentanyl (Sublimaze Injection -)  50 mcg IVPUSH [id number] PRN


   PRN Reason: PAIN


   Stop: 08/20/17 16:53


   Last Admin: 08/17/17 17:15 Dose:  50 mcg


Piperacillin Sod/Tazobactam (Sod 3.375 gm/ Dextrose)  50 mls @ 100 mls/hr IVPB 

Q8H-IV [facility]


   PRN Reason: Protocol


   Last Admin: 08/19/17 09:40 Dose:  100 mls/hr


Potassium Chloride/Sodium Chloride (Ns+20 Meq Kcl -)  1,000 mls @ 75 mls/hr IV 

ASDIR [facility]


   Last Admin: 08/19/17 14:20 Dose:  75 mls/hr


Insulin Aspart (Novolog Vial Sliding Scale -)  1 vial SQ ACHS [facility]


   PRN Reason: Protocol


   Last Admin: 08/19/17 11:45 Dose:  4 units


Insulin Detemir (Levemir Vial)  40 units SQ HS [facility]


Morphine Sulfate (Morphine Injection -)  2 mg IVPUSH Q6H PRN


   PRN Reason: PAIN


Ondansetron HCl (Zofran Injection)  4 mg IVPB Q6H PRN


   PRN Reason: NAUSEA


Oxycodone HCl (Roxicodone -)  5 mg PO Q4H PRN


   PRN Reason: PAIN


   Last Admin: 08/19/17 13:32 Dose:  5 mg


Ramipril (Altace -)  5 mg PO DAILY [facility]


   Last Admin: 08/19/17 09:08 Dose:  Not Given


Spironolactone (Aldactone -)  25 mg PO DAILY [facility]


   Last Admin: 08/19/17 09:08 Dose:  Not Given











- Objective


Vital Signs: 


 Vital Signs











Temperature  97.7 F   08/19/17 14:21


 


Pulse Rate  74   08/19/17 14:21


 


Respiratory Rate  20   08/19/17 14:21


 


Blood Pressure  135/54   08/19/17 14:21


 


O2 Sat by Pulse Oximetry (%)  98   08/18/17 21:00











Constitutional: Yes: No Distress


HENT: Yes: WNL


Cardiovascular: Yes: Regular Rate and Rhythm


Respiratory: Yes: CTA Bilaterally


Gastrointestinal: Yes: Normal Bowel Sounds, Soft


Genitourinary: Yes: Other (Lt vulva indurated/tender)


Extremities: Yes: WNL


Wound/Incision: Yes: Clean/Dry


Neurological: Yes: WNL


Labs: 


 CBC, BMP





 08/18/17 08:30 





 08/16/17 23:38 





 INR, PTT











INR  1.27  (0.82-1.09)  H  08/16/17  18:35    














Problem List





- Problems


(1) Abscess of labia majora


Code(s): N76.4 - ABSCESS OF VULVA





(2) Coronary artery disease


Code(s): I25.10 - ATHSCL HEART DISEASE OF NATIVE CORONARY ARTERY W/O ANG PCTRS 

  Qualifiers: 


     Coronary Disease-Associated Artery/Lesion type: unspecified vessel or 

lesion type     Native vs. transplanted heart: native heart     Associated 

angina: without angina        Qualified Code(s): I25.10 - Atherosclerotic heart 

disease of native coronary artery without angina pectoris  





(3) Uncontrolled diabetes mellitus


Code(s): E11.65 - TYPE 2 DIABETES MELLITUS WITH HYPERGLYCEMIA   





(4) MOLLY (acute kidney injury)


Code(s): N17.9 - ACUTE KIDNEY FAILURE, UNSPECIFIED








Assessment/Plan


 s/p I+D of vulvar abscess


Leukocytosis - wbc mildly elevated


Afebrile today


- continue current antibiotics


- follow up final wound culture results


- continue wound care

## 2017-08-20 LAB
ALBUMIN SERPL-MCNC: 2.3 G/DL (ref 3.4–5)
ALBUMIN SERPL-MCNC: 2.5 G/DL (ref 3.4–5)
ALP SERPL-CCNC: 51 U/L (ref 45–117)
ALP SERPL-CCNC: 58 U/L (ref 45–117)
ALT SERPL-CCNC: 18 U/L (ref 12–78)
ALT SERPL-CCNC: 19 U/L (ref 12–78)
ANION GAP SERPL CALC-SCNC: 7 MMOL/L (ref 8–16)
ANION GAP SERPL CALC-SCNC: 8 MMOL/L (ref 8–16)
AST SERPL-CCNC: 14 U/L (ref 15–37)
AST SERPL-CCNC: 17 U/L (ref 15–37)
BASOPHILS # BLD: 0.4 % (ref 0–2)
BASOPHILS # BLD: 0.5 % (ref 0–2)
BILIRUB SERPL-MCNC: 0.5 MG/DL (ref 0.2–1)
BILIRUB SERPL-MCNC: 0.6 MG/DL (ref 0.2–1)
CALCIUM SERPL-MCNC: 7.5 MG/DL (ref 8.5–10.1)
CALCIUM SERPL-MCNC: 7.6 MG/DL (ref 8.5–10.1)
CO2 SERPL-SCNC: 21 MMOL/L (ref 21–32)
CO2 SERPL-SCNC: 22 MMOL/L (ref 21–32)
CREAT SERPL-MCNC: 0.7 MG/DL (ref 0.55–1.02)
CREAT SERPL-MCNC: 0.8 MG/DL (ref 0.55–1.02)
DEPRECATED RDW RBC AUTO: 13.5 % (ref 11.6–15.6)
DEPRECATED RDW RBC AUTO: 13.6 % (ref 11.6–15.6)
EOSINOPHIL # BLD: 2.6 % (ref 0–4.5)
EOSINOPHIL # BLD: 4.1 % (ref 0–4.5)
GLUCOSE SERPL-MCNC: 216 MG/DL (ref 74–106)
GLUCOSE SERPL-MCNC: 63 MG/DL (ref 74–106)
MCH RBC QN AUTO: 30.4 PG (ref 25.7–33.7)
MCH RBC QN AUTO: 30.5 PG (ref 25.7–33.7)
MCHC RBC AUTO-ENTMCNC: 33.6 G/DL (ref 32–36)
MCHC RBC AUTO-ENTMCNC: 34.3 G/DL (ref 32–36)
MCV RBC: 88.9 FL (ref 80–96)
MCV RBC: 90.6 FL (ref 80–96)
NEUTROPHILS # BLD: 69.9 % (ref 42.8–82.8)
NEUTROPHILS # BLD: 75.9 % (ref 42.8–82.8)
PLATELET # BLD AUTO: 180 K/MM3 (ref 134–434)
PLATELET # BLD AUTO: 183 K/MM3 (ref 134–434)
PMV BLD: 7.8 FL (ref 7.5–11.1)
PMV BLD: 8.3 FL (ref 7.5–11.1)
PROT SERPL-MCNC: 5.4 G/DL (ref 6.4–8.2)
PROT SERPL-MCNC: 5.8 G/DL (ref 6.4–8.2)
WBC # BLD AUTO: 9.3 K/MM3 (ref 4–10)
WBC # BLD AUTO: 9.9 K/MM3 (ref 4–10)

## 2017-08-20 RX ADMIN — PIPERACILLIN SODIUM,TAZOBACTAM SODIUM SCH MLS/HR: 3; .375 INJECTION, POWDER, FOR SOLUTION INTRAVENOUS at 18:06

## 2017-08-20 RX ADMIN — POTASSIUM CHLORIDE AND SODIUM CHLORIDE SCH MLS/HR: 900; 150 INJECTION, SOLUTION INTRAVENOUS at 06:11

## 2017-08-20 RX ADMIN — SODIUM CHLORIDE SCH MLS/HR: 9 INJECTION, SOLUTION INTRAVENOUS at 15:45

## 2017-08-20 RX ADMIN — INSULIN DETEMIR SCH UNITS: 100 INJECTION, SOLUTION SUBCUTANEOUS at 21:59

## 2017-08-20 RX ADMIN — INSULIN ASPART SCH: 100 INJECTION, SOLUTION INTRAVENOUS; SUBCUTANEOUS at 17:52

## 2017-08-20 RX ADMIN — INSULIN ASPART SCH: 100 INJECTION, SOLUTION INTRAVENOUS; SUBCUTANEOUS at 06:10

## 2017-08-20 RX ADMIN — PIPERACILLIN SODIUM,TAZOBACTAM SODIUM SCH MLS/HR: 3; .375 INJECTION, POWDER, FOR SOLUTION INTRAVENOUS at 02:27

## 2017-08-20 RX ADMIN — CARVEDILOL SCH MG: 12.5 TABLET, FILM COATED ORAL at 11:01

## 2017-08-20 RX ADMIN — CLINDAMYCIN HYDROCHLORIDE SCH MG: 150 CAPSULE ORAL at 06:10

## 2017-08-20 RX ADMIN — PIPERACILLIN SODIUM,TAZOBACTAM SODIUM SCH MLS/HR: 3; .375 INJECTION, POWDER, FOR SOLUTION INTRAVENOUS at 11:02

## 2017-08-20 RX ADMIN — SPIRONOLACTONE SCH MG: 25 TABLET, FILM COATED ORAL at 11:01

## 2017-08-20 RX ADMIN — RAMIPRIL SCH MG: 5 CAPSULE ORAL at 11:01

## 2017-08-20 RX ADMIN — INSULIN ASPART SCH UNITS: 100 INJECTION, SOLUTION INTRAVENOUS; SUBCUTANEOUS at 21:58

## 2017-08-20 RX ADMIN — CARVEDILOL SCH MG: 12.5 TABLET, FILM COATED ORAL at 21:49

## 2017-08-20 RX ADMIN — INSULIN ASPART SCH UNITS: 100 INJECTION, SOLUTION INTRAVENOUS; SUBCUTANEOUS at 12:13

## 2017-08-20 NOTE — PN
Progress Note, Physician


History of Present Illness: 


Pt is alert, afebrile. States she had loose BM earlier today. Still "sore" in 

vaginal region. No other specific complaints.





- Current Medication List


Current Medications: 


Active Medications





Acetaminophen (Tylenol -)  650 mg PO Q6H PRN


   PRN Reason: FEVER OR PAIN


   Last Admin: 08/18/17 06:05 Dose:  650 mg


Carvedilol (Coreg -)  12.5 mg PO BID [facility]


   Last Admin: 08/20/17 11:01 Dose:  12.5 mg


Fentanyl (Sublimaze Injection -)  50 mcg IVPUSH [id number] PRN


   PRN Reason: PAIN


   Stop: 08/20/17 16:53


   Last Admin: 08/17/17 17:15 Dose:  50 mcg


Piperacillin Sod/Tazobactam (Sod 3.375 gm/ Dextrose)  50 mls @ 100 mls/hr IVPB 

Q8H-IV [facility]


   PRN Reason: Protocol


   Last Admin: 08/20/17 11:02 Dose:  100 mls/hr


Potassium Chloride/Sodium Chloride (Ns+20 Meq Kcl -)  1,000 mls @ 75 mls/hr IV 

ASDIR [facility]


   Last Admin: 08/20/17 06:11 Dose:  75 mls/hr


Insulin Aspart (Novolog Vial Sliding Scale -)  1 vial SQ ACHS [facility]


   PRN Reason: Protocol


   Last Admin: 08/20/17 12:13 Dose:  4 units


Insulin Detemir (Levemir Vial)  40 units SQ HS [facility]


   Last Admin: 08/19/17 21:57 Dose:  40 units


Morphine Sulfate (Morphine Injection -)  2 mg IVPUSH Q6H PRN


   PRN Reason: PAIN


Ondansetron HCl (Zofran Injection)  4 mg IVPB Q6H PRN


   PRN Reason: NAUSEA


Oxycodone HCl (Roxicodone -)  5 mg PO Q4H PRN


   PRN Reason: PAIN


   Last Admin: 08/19/17 22:11 Dose:  5 mg


Ramipril (Altace -)  5 mg PO DAILY [facility]


   Last Admin: 08/20/17 11:01 Dose:  5 mg


Spironolactone (Aldactone -)  25 mg PO DAILY [facility]


   Last Admin: 08/20/17 11:01 Dose:  25 mg











- Objective


Vital Signs: 


 Vital Signs











Temperature  97.9 F   08/20/17 10:52


 


Pulse Rate  71   08/20/17 10:52


 


Respiratory Rate  24   08/20/17 10:52


 


Blood Pressure  121/61   08/20/17 10:52


 


O2 Sat by Pulse Oximetry (%)  95   08/19/17 21:00











Constitutional: Yes: No Distress, Calm


Neck: Yes: WNL


Cardiovascular: Yes: Regular Rate and Rhythm


Respiratory: Yes: CTA Bilaterally


Gastrointestinal: Yes: Normal Bowel Sounds, Soft


Musculoskeletal: Yes: WNL


Extremities: Yes: WNL


Wound/Incision: Yes: Clean/Dry, Other (still with labial induration, no 

purulence noted today)


Labs: 


 CBC, BMP





 08/18/17 08:30 





 08/16/17 23:38 





 INR, PTT











INR  1.27  (0.82-1.09)  H  08/16/17  18:35    











8/20/17  wbc - 9.3  (not documented in lab section yet)





Problem List





- Problems


(1) Abscess of labia majora


Code(s): N76.4 - ABSCESS OF VULVA





(2) Coronary artery disease


Code(s): I25.10 - ATHSCL HEART DISEASE OF NATIVE CORONARY ARTERY W/O ANG PCTRS 

  Qualifiers: 


     Coronary Disease-Associated Artery/Lesion type: unspecified vessel or 

lesion type     Native vs. transplanted heart: native heart     Associated 

angina: without angina        Qualified Code(s): I25.10 - Atherosclerotic heart 

disease of native coronary artery without angina pectoris  





(3) Uncontrolled diabetes mellitus


Code(s): E11.65 - TYPE 2 DIABETES MELLITUS WITH HYPERGLYCEMIA   





(4) MOLLY (acute kidney injury)


Code(s): N17.9 - ACUTE KIDNEY FAILURE, UNSPECIFIED








Assessment/Plan


Vulvar abscess - still indurated with some pain at site


Diarrhea - earlier today, no abd cramping


Leukocytosis - wbc normal today





Clindamycin d/c'd


suggest continue Zosyn for now


stool c. diff testing requested


repeat cbc in a.m.


monitor for resolution of vulvar induration

## 2017-08-20 NOTE — PN
Progress Note, Physician


Chief Complaint: 


C/o Pain 


History of Present Illness: 


72 yrs with uncontrolled T2DM, CAD, CHF admitted with uncontrolled DM and 

Labial ulcer underwent I and D under [place] c/o diarrhea F/U C Diff





- Current Medication List


Current Medications: 


Active Medications





Acetaminophen (Tylenol -)  650 mg PO Q6H PRN


   PRN Reason: FEVER OR PAIN


   Last Admin: 08/18/17 06:05 Dose:  650 mg


Carvedilol (Coreg -)  12.5 mg PO BID [facility]


   Last Admin: 08/19/17 21:57 Dose:  12.5 mg


Fentanyl (Sublimaze Injection -)  50 mcg IVPUSH [id number] PRN


   PRN Reason: PAIN


   Stop: 08/20/17 16:53


   Last Admin: 08/17/17 17:15 Dose:  50 mcg


Piperacillin Sod/Tazobactam (Sod 3.375 gm/ Dextrose)  50 mls @ 100 mls/hr IVPB 

Q8H-IV [facility]


   PRN Reason: Protocol


   Last Admin: 08/20/17 02:27 Dose:  100 mls/hr


Potassium Chloride/Sodium Chloride (Ns+20 Meq Kcl -)  1,000 mls @ 75 mls/hr IV 

ASDIR [facility]


   Last Admin: 08/20/17 06:11 Dose:  75 mls/hr


Insulin Aspart (Novolog Vial Sliding Scale -)  1 vial SQ ACHS [facility]


   PRN Reason: Protocol


   Last Admin: 08/20/17 06:10 Dose:  Not Given


Insulin Detemir (Levemir Vial)  40 units SQ HS [facility]


   Last Admin: 08/19/17 21:57 Dose:  40 units


Morphine Sulfate (Morphine Injection -)  2 mg IVPUSH Q6H PRN


   PRN Reason: PAIN


Ondansetron HCl (Zofran Injection)  4 mg IVPB Q6H PRN


   PRN Reason: NAUSEA


Oxycodone HCl (Roxicodone -)  5 mg PO Q4H PRN


   PRN Reason: PAIN


   Last Admin: 08/19/17 22:11 Dose:  5 mg


Ramipril (Altace -)  5 mg PO DAILY [facility]


   Last Admin: 08/19/17 09:08 Dose:  Not Given


Spironolactone (Aldactone -)  25 mg PO DAILY [facility]


   Last Admin: 08/19/17 09:08 Dose:  Not Given











- Objective


Vital Signs: 


 Vital Signs











Temperature  98.5 F   08/20/17 05:26


 


Pulse Rate  69   08/20/17 05:26


 


Respiratory Rate  20   08/20/17 05:26


 


Blood Pressure  133/44   08/20/17 05:26


 


O2 Sat by Pulse Oximetry (%)  95   08/19/17 21:00








General: Elderly F not in distress c/o less pain





HEENT: ET tube at place, MM moist, PERRLA,





NECK; NO NVD, No Bruit, Carotids +





CHEST: B/L equal AE





CVS: S1 S2 R no m/g/r





ABD: No distention non tender





; Left labial abscess I and D





EXT: ,Trace edema feet, no calf tenderness, Pulses +


Labs: 


 CBC, BMP





 08/18/17 08:30 





 08/16/17 23:38 





 INR, PTT











INR  1.27  (0.82-1.09)  H  08/16/17  18:35    








CBC,CMP














Problem List





- Problems


(1) Abscess of left genital labia


Assessment/Plan: 


 nGrew Strept will DC Clinda can be switched to Levofloxacin


Code(s): N76.4 - ABSCESS OF VULVA





(2) HTN (hypertension)


Assessment/Plan: 


Well controlled cont current meds


Code(s): I10 - ESSENTIAL (PRIMARY) HYPERTENSION   Qualifiers: 


     Hypertension type: essential hypertension        Qualified Code(s): I10 - 

Essential (primary) hypertension  





(3) Uncontrolled diabetes mellitus


Assessment/Plan: 


Diabetic Diet optimize Glycemic control increase Levimir dose


Code(s): E11.65 - TYPE 2 DIABETES MELLITUS WITH HYPERGLYCEMIA   





(4) Coronary artery disease


Assessment/Plan: 


Stable no active issue


Code(s): I25.10 - ATHSCL HEART DISEASE OF NATIVE CORONARY ARTERY W/O ANG PCTRS 

  Qualifiers: 


     Coronary Disease-Associated Artery/Lesion type: unspecified vessel or 

lesion type     Native vs. transplanted heart: native heart     Associated 

angina: without angina        Qualified Code(s): I25.10 - Atherosclerotic heart 

disease of native coronary artery without angina pectoris  





(5) NYHA class 3 heart failure with reduced ejection fraction


Assessment/Plan: 


Ischemic Cardiomyopathy with reduced Ef at present compensated


Code(s): I50.9 - HEART FAILURE, UNSPECIFIED

## 2017-08-21 LAB
ALBUMIN SERPL-MCNC: 2.4 G/DL (ref 3.4–5)
ALP SERPL-CCNC: 57 U/L (ref 45–117)
ALT SERPL-CCNC: 18 U/L (ref 12–78)
ANION GAP SERPL CALC-SCNC: 8 MMOL/L (ref 8–16)
AST SERPL-CCNC: 14 U/L (ref 15–37)
BASOPHILS # BLD: 0.5 % (ref 0–2)
BILIRUB SERPL-MCNC: 0.5 MG/DL (ref 0.2–1)
CALCIUM SERPL-MCNC: 7.5 MG/DL (ref 8.5–10.1)
CO2 SERPL-SCNC: 20 MMOL/L (ref 21–32)
CREAT SERPL-MCNC: 0.6 MG/DL (ref 0.55–1.02)
DEPRECATED RDW RBC AUTO: 14 % (ref 11.6–15.6)
EOSINOPHIL # BLD: 2.2 % (ref 0–4.5)
GLUCOSE SERPL-MCNC: 147 MG/DL (ref 74–106)
MCH RBC QN AUTO: 30.4 PG (ref 25.7–33.7)
MCHC RBC AUTO-ENTMCNC: 33.6 G/DL (ref 32–36)
MCV RBC: 90.4 FL (ref 80–96)
NEUTROPHILS # BLD: 75.8 % (ref 42.8–82.8)
PLATELET # BLD AUTO: 193 K/MM3 (ref 134–434)
PMV BLD: 7.9 FL (ref 7.5–11.1)
PROT SERPL-MCNC: 5.9 G/DL (ref 6.4–8.2)
WBC # BLD AUTO: 7.8 K/MM3 (ref 4–10)

## 2017-08-21 RX ADMIN — SODIUM CHLORIDE SCH MLS/HR: 9 INJECTION, SOLUTION INTRAVENOUS at 04:30

## 2017-08-21 RX ADMIN — CARVEDILOL SCH MG: 12.5 TABLET, FILM COATED ORAL at 22:25

## 2017-08-21 RX ADMIN — PIPERACILLIN SODIUM,TAZOBACTAM SODIUM SCH MLS/HR: 3; .375 INJECTION, POWDER, FOR SOLUTION INTRAVENOUS at 17:52

## 2017-08-21 RX ADMIN — INSULIN ASPART SCH: 100 INJECTION, SOLUTION INTRAVENOUS; SUBCUTANEOUS at 06:14

## 2017-08-21 RX ADMIN — INSULIN ASPART SCH UNITS: 100 INJECTION, SOLUTION INTRAVENOUS; SUBCUTANEOUS at 22:24

## 2017-08-21 RX ADMIN — INSULIN ASPART SCH UNITS: 100 INJECTION, SOLUTION INTRAVENOUS; SUBCUTANEOUS at 12:06

## 2017-08-21 RX ADMIN — ACETAMINOPHEN PRN MG: 325 TABLET ORAL at 22:26

## 2017-08-21 RX ADMIN — INSULIN DETEMIR SCH UNIT: 100 INJECTION, SOLUTION SUBCUTANEOUS at 22:24

## 2017-08-21 RX ADMIN — SPIRONOLACTONE SCH MG: 25 TABLET, FILM COATED ORAL at 09:53

## 2017-08-21 RX ADMIN — ACETAMINOPHEN PRN MG: 325 TABLET ORAL at 17:52

## 2017-08-21 RX ADMIN — CARVEDILOL SCH MG: 12.5 TABLET, FILM COATED ORAL at 09:53

## 2017-08-21 RX ADMIN — PIPERACILLIN SODIUM,TAZOBACTAM SODIUM SCH MLS/HR: 3; .375 INJECTION, POWDER, FOR SOLUTION INTRAVENOUS at 02:30

## 2017-08-21 RX ADMIN — SODIUM CHLORIDE SCH MLS/HR: 9 INJECTION, SOLUTION INTRAVENOUS at 23:18

## 2017-08-21 RX ADMIN — PIPERACILLIN SODIUM,TAZOBACTAM SODIUM SCH MLS/HR: 3; .375 INJECTION, POWDER, FOR SOLUTION INTRAVENOUS at 09:52

## 2017-08-21 RX ADMIN — RAMIPRIL SCH MG: 5 CAPSULE ORAL at 09:53

## 2017-08-21 RX ADMIN — SODIUM CHLORIDE SCH MLS/HR: 9 INJECTION, SOLUTION INTRAVENOUS at 17:53

## 2017-08-21 RX ADMIN — INSULIN ASPART SCH UNITS: 100 INJECTION, SOLUTION INTRAVENOUS; SUBCUTANEOUS at 17:31

## 2017-08-21 NOTE — PN
Progress Note, Physician


Chief Complaint: 


C/o Pain 


History of Present Illness: 


72 yrs with uncontrolled T2DM, CAD, CHF admitted with uncontrolled DM and 

Labial ulcer underwent I and D under [facility] c/o diarrhea F/U C Diff





- Current Medication List


Current Medications: 


Active Medications





Acetaminophen (Tylenol -)  650 mg PO Q6H PRN


   PRN Reason: FEVER OR PAIN


   Last Admin: 08/18/17 06:05 Dose:  650 mg


Carvedilol (Coreg -)  12.5 mg PO BID [facility]


   Last Admin: 08/20/17 21:49 Dose:  12.5 mg


Piperacillin Sod/Tazobactam (Sod 3.375 gm/ Dextrose)  50 mls @ 100 mls/hr IVPB 

Q8H-IV [facility]


   PRN Reason: Protocol


   Last Admin: 08/21/17 02:30 Dose:  100 mls/hr


Sodium Chloride (Normal Saline -)  1,000 mls @ 75 mls/hr IV ASDIR [facility]


   Last Admin: 08/21/17 04:30 Dose:  75 mls/hr


Insulin Aspart (Novolog Vial Sliding Scale -)  1 vial SQ ACHS [facility]


   PRN Reason: Protocol


   Last Admin: 08/21/17 06:14 Dose:  Not Given


Insulin Detemir (Levemir Vial)  40 units SQ HS [facility]


   Last Admin: 08/20/17 21:59 Dose:  40 units


Ondansetron HCl (Zofran Injection)  4 mg IVPB Q6H PRN


   PRN Reason: NAUSEA


Oxycodone HCl (Roxicodone -)  5 mg PO Q4H PRN


   PRN Reason: PAIN


   Last Admin: 08/20/17 21:49 Dose:  5 mg


Ramipril (Altace -)  5 mg PO DAILY [facility]


   Last Admin: 08/20/17 11:01 Dose:  5 mg


Spironolactone (Aldactone -)  25 mg PO DAILY [facility]


   Last Admin: 08/20/17 11:01 Dose:  25 mg











- Objective


Vital Signs: 


 Vital Signs











Temperature  97.5 F L  08/21/17 06:00


 


Pulse Rate  53 L  08/21/17 06:00


 


Respiratory Rate  20   08/21/17 06:00


 


Blood Pressure  111/41   08/21/17 06:00


 


O2 Sat by Pulse Oximetry (%)  98   08/20/17 21:00








General: Elderly F not in distress c/o less pain





HEENT: ET tube at place, MM moist, PERRLA,





NECK; NO NVD, No Bruit, Carotids +





CHEST: B/L equal AE





CVS: S1 S2 R no m/g/r





ABD: No distention non tender





; Left labial abscess I and D





EXT: ,Trace edema feet, no calf tenderness, Pulses +


Labs: 


 CBC, BMP





 08/18/17 08:30 





 08/16/17 23:38 





 INR, PTT











INR  1.27  (0.82-1.09)  H  08/16/17  18:35    














Problem List





- Problems


(1) Abscess of left genital labia


Assessment/Plan: 


 Grew Strept can be switched to Levofloxacin f/u ID


Code(s): N76.4 - ABSCESS OF VULVA





(2) HTN (hypertension)


Assessment/Plan: 


Well controlled cont current meds


Code(s): I10 - ESSENTIAL (PRIMARY) HYPERTENSION   Qualifiers: 


     Hypertension type: essential hypertension        Qualified Code(s): I10 - 

Essential (primary) hypertension  





(3) Uncontrolled diabetes mellitus


Assessment/Plan: 


Diabetic Diet optimize Glycemic control increase Levimir dose


Code(s): E11.65 - TYPE 2 DIABETES MELLITUS WITH HYPERGLYCEMIA   





(4) Coronary artery disease


Assessment/Plan: 


Stable no active issue


Code(s): I25.10 - ATHSCL HEART DISEASE OF NATIVE CORONARY ARTERY W/O ANG PCTRS 

  Qualifiers: 


     Coronary Disease-Associated Artery/Lesion type: unspecified vessel or 

lesion type     Native vs. transplanted heart: native heart     Associated 

angina: without angina        Qualified Code(s): I25.10 - Atherosclerotic heart 

disease of native coronary artery without angina pectoris  





(5) NYHA class 3 heart failure with reduced ejection fraction


Assessment/Plan: 


Ischemic Cardiomyopathy with reduced Ef at present compensated


Code(s): I50.9 - HEART FAILURE, UNSPECIFIED

## 2017-08-21 NOTE — PN
Progress Note, Physician


History of Present Illness: 


patient stable


no fever





- Current Medication List


Current Medications: 


Active Medications





Acetaminophen (Tylenol -)  650 mg PO Q6H PRN


   PRN Reason: FEVER OR PAIN


   Last Admin: 08/18/17 06:05 Dose:  650 mg


Carvedilol (Coreg -)  12.5 mg PO BID [facility]


   Last Admin: 08/21/17 09:53 Dose:  12.5 mg


Piperacillin Sod/Tazobactam (Sod 3.375 gm/ Dextrose)  50 mls @ 100 mls/hr IVPB 

Q8H-IV [facility]


   PRN Reason: Protocol


   Last Admin: 08/21/17 09:52 Dose:  100 mls/hr


Sodium Chloride (Normal Saline -)  1,000 mls @ 75 mls/hr IV ASDIR [facility]


   Last Admin: 08/21/17 04:30 Dose:  75 mls/hr


Insulin Aspart (Novolog Vial Sliding Scale -)  1 vial SQ ACHS [facility]


   PRN Reason: Protocol


   Last Admin: 08/21/17 12:06 Dose:  8 units


Insulin Detemir (Levemir Vial)  40 units SQ HS [facility]


   Last Admin: 08/20/17 21:59 Dose:  40 units


Ondansetron HCl (Zofran Injection)  4 mg IVPB Q6H PRN


   PRN Reason: NAUSEA


Oxycodone HCl (Roxicodone -)  5 mg PO Q4H PRN


   PRN Reason: PAIN


   Last Admin: 08/20/17 21:49 Dose:  5 mg


Ramipril (Altace -)  5 mg PO DAILY [facility]


   Last Admin: 08/21/17 09:53 Dose:  5 mg


Spironolactone (Aldactone -)  25 mg PO DAILY [facility]


   Last Admin: 08/21/17 09:53 Dose:  25 mg











- Objective


Vital Signs: 


 Vital Signs











Temperature  97.4 F L  08/21/17 09:53


 


Pulse Rate  62   08/21/17 09:53


 


Respiratory Rate  18   08/21/17 09:53


 


Blood Pressure  115/46   08/21/17 09:53


 


O2 Sat by Pulse Oximetry (%)  98   08/20/17 21:00











Constitutional: Yes: No Distress, Calm


Cardiovascular: Yes: Regular Rate and Rhythm


Respiratory: Yes: Regular, CTA Bilaterally


Gastrointestinal: Yes: Normal Bowel Sounds, Soft


Musculoskeletal: Yes: WNL


Extremities: Yes: WNL


Neurological: Yes: Alert, Oriented


Psychiatric: Yes: Alert


Labs: 


 CBC, BMP





 08/18/17 08:30 





 08/16/17 23:38 





 INR, PTT











INR  1.27  (0.82-1.09)  H  08/16/17  18:35    














Assessment/Plan


patient evaluated


patient has labial abscess





left labial abscess


dm





plan


continue current mgmt


patient has been afebrile


wound cx awaited


check wbc


once we have wound cx we can switch to them accordingly

## 2017-08-22 LAB
ALBUMIN SERPL-MCNC: 2.2 G/DL (ref 3.4–5)
ALP SERPL-CCNC: 47 U/L (ref 45–117)
ALT SERPL-CCNC: 14 U/L (ref 12–78)
ANION GAP SERPL CALC-SCNC: 9 MMOL/L (ref 8–16)
AST SERPL-CCNC: 7 U/L (ref 15–37)
BASOPHILS # BLD: 0.7 % (ref 0–2)
BILIRUB SERPL-MCNC: 0.4 MG/DL (ref 0.2–1)
CALCIUM SERPL-MCNC: 7.3 MG/DL (ref 8.5–10.1)
CO2 SERPL-SCNC: 21 MMOL/L (ref 21–32)
CREAT SERPL-MCNC: 0.6 MG/DL (ref 0.55–1.02)
DEPRECATED RDW RBC AUTO: 13.7 % (ref 11.6–15.6)
EOSINOPHIL # BLD: 4.9 % (ref 0–4.5)
GLUCOSE SERPL-MCNC: 110 MG/DL (ref 74–106)
MCH RBC QN AUTO: 29.9 PG (ref 25.7–33.7)
MCHC RBC AUTO-ENTMCNC: 33.2 G/DL (ref 32–36)
MCV RBC: 90.1 FL (ref 80–96)
NEUTROPHILS # BLD: 56.8 % (ref 42.8–82.8)
PLATELET # BLD AUTO: 189 K/MM3 (ref 134–434)
PMV BLD: 7.5 FL (ref 7.5–11.1)
PROT SERPL-MCNC: 5.2 G/DL (ref 6.4–8.2)
WBC # BLD AUTO: 7.8 K/MM3 (ref 4–10)

## 2017-08-22 RX ADMIN — CARVEDILOL SCH MG: 12.5 TABLET, FILM COATED ORAL at 10:24

## 2017-08-22 RX ADMIN — INSULIN ASPART SCH: 100 INJECTION, SOLUTION INTRAVENOUS; SUBCUTANEOUS at 12:47

## 2017-08-22 RX ADMIN — PIPERACILLIN SODIUM,TAZOBACTAM SODIUM SCH MLS/HR: 3; .375 INJECTION, POWDER, FOR SOLUTION INTRAVENOUS at 01:50

## 2017-08-22 RX ADMIN — INSULIN ASPART SCH: 100 INJECTION, SOLUTION INTRAVENOUS; SUBCUTANEOUS at 17:33

## 2017-08-22 RX ADMIN — ACETAMINOPHEN PRN MG: 325 TABLET ORAL at 06:23

## 2017-08-22 RX ADMIN — INSULIN ASPART SCH: 100 INJECTION, SOLUTION INTRAVENOUS; SUBCUTANEOUS at 06:20

## 2017-08-22 RX ADMIN — INSULIN ASPART SCH: 100 INJECTION, SOLUTION INTRAVENOUS; SUBCUTANEOUS at 21:01

## 2017-08-22 RX ADMIN — PIPERACILLIN SODIUM,TAZOBACTAM SODIUM SCH MLS/HR: 3; .375 INJECTION, POWDER, FOR SOLUTION INTRAVENOUS at 10:25

## 2017-08-22 RX ADMIN — SODIUM CHLORIDE SCH MLS/HR: 9 INJECTION, SOLUTION INTRAVENOUS at 12:50

## 2017-08-22 RX ADMIN — INSULIN DETEMIR SCH UNIT: 100 INJECTION, SOLUTION SUBCUTANEOUS at 21:00

## 2017-08-22 RX ADMIN — ACETAMINOPHEN PRN MG: 325 TABLET ORAL at 14:21

## 2017-08-22 RX ADMIN — SPIRONOLACTONE SCH MG: 25 TABLET, FILM COATED ORAL at 10:25

## 2017-08-22 RX ADMIN — PIPERACILLIN SODIUM,TAZOBACTAM SODIUM SCH MLS/HR: 3; .375 INJECTION, POWDER, FOR SOLUTION INTRAVENOUS at 17:33

## 2017-08-22 RX ADMIN — CARVEDILOL SCH MG: 12.5 TABLET, FILM COATED ORAL at 21:00

## 2017-08-22 RX ADMIN — RAMIPRIL SCH MG: 5 CAPSULE ORAL at 10:24

## 2017-08-22 NOTE — PN
Progress Note, Physician


Chief Complaint: 


C/o Pain  afebrile, CBC normal mild drop in H/h


History of Present Illness: 


72 yrs with uncontrolled T2DM, CAD, CHF admitted with uncontrolled DM and 

Labial ulcer underwent I and D  improving  diarrhea resolved.





- Current Medication List


Current Medications: 


Active Medications





Acetaminophen (Tylenol -)  650 mg PO Q6H PRN


   PRN Reason: FEVER OR PAIN


   Last Admin: 08/22/17 14:21 Dose:  650 mg


Carvedilol (Coreg -)  12.5 mg PO BID [facility]


   Last Admin: 08/22/17 10:24 Dose:  12.5 mg


Piperacillin Sod/Tazobactam (Sod 3.375 gm/ Dextrose)  50 mls @ 100 mls/hr IVPB 

Q8H-IV MY[facility]


   PRN Reason: Protocol


   Last Admin: 08/22/17 10:25 Dose:  100 mls/hr


Insulin Aspart (Novolog Vial Sliding Scale -)  1 vial SQ ACHS [facility]


   PRN Reason: Protocol


   Last Admin: 08/22/17 12:47 Dose:  Not Given


Insulin Detemir (Levemir Vial)  44 units SQ HS [facility]


   Last Admin: 08/21/17 22:24 Dose:  44 unit


Ondansetron HCl (Zofran Injection)  4 mg IVPB Q6H PRN


   PRN Reason: NAUSEA


   Last Admin: 08/22/17 01:50 Dose:  4 mg


Oxycodone HCl (Roxicodone -)  5 mg PO Q4H PRN


   PRN Reason: PAIN


   Last Admin: 08/22/17 14:22 Dose:  5 mg


Ramipril (Altace -)  5 mg PO DAILY [facility]


   Last Admin: 08/22/17 10:24 Dose:  5 mg


Spironolactone (Aldactone -)  25 mg PO DAILY [facility]


   Last Admin: 08/22/17 10:25 Dose:  25 mg











- Objective


Vital Signs: 


 Vital Signs











Temperature  98.3 F   08/22/17 13:55


 


Pulse Rate  73   08/22/17 13:55


 


Respiratory Rate  18   08/22/17 13:55


 


Blood Pressure  146/67   08/22/17 13:55


 


O2 Sat by Pulse Oximetry (%)  99   08/22/17 09:00











Labs: 


 CBC, BMP





 08/22/17 06:00 





 08/22/17 06:00 





 INR, PTT











INR  1.27  (0.82-1.09)  H  08/16/17  18:35    














Problem List





- Problems


(1) Abscess of left genital labia


Code(s): N76.4 - ABSCESS OF VULVA





(2) HTN (hypertension)


Code(s): I10 - ESSENTIAL (PRIMARY) HYPERTENSION   Qualifiers: 


     Hypertension type: essential hypertension        Qualified Code(s): I10 - 

Essential (primary) hypertension  





(3) Uncontrolled diabetes mellitus


Code(s): E11.65 - TYPE 2 DIABETES MELLITUS WITH HYPERGLYCEMIA   





(4) Coronary artery disease


Code(s): I25.10 - ATHSCL HEART DISEASE OF NATIVE CORONARY ARTERY W/O ANG PCTRS 

  Qualifiers: 


     Coronary Disease-Associated Artery/Lesion type: unspecified vessel or 

lesion type     Native vs. transplanted heart: native heart     Associated 

angina: without angina        Qualified Code(s): I25.10 - Atherosclerotic heart 

disease of native coronary artery without angina pectoris  





(5) NYHA class 3 heart failure with reduced ejection fraction


Code(s): I50.9 - HEART FAILURE, UNSPECIFIED

## 2017-08-22 NOTE — PN
Progress Note, Physician


History of Present Illness: 


patient stable


no fever


doing well





- Current Medication List


Current Medications: 


Active Medications





Acetaminophen (Tylenol -)  650 mg PO Q6H PRN


   PRN Reason: FEVER OR PAIN


   Last Admin: 08/22/17 14:21 Dose:  650 mg


Carvedilol (Coreg -)  12.5 mg PO BID [facility]


   Last Admin: 08/22/17 10:24 Dose:  12.5 mg


Piperacillin Sod/Tazobactam (Sod 3.375 gm/ Dextrose)  50 mls @ 100 mls/hr IVPB 

Q8H-IV MY[facility]


   PRN Reason: Protocol


   Last Admin: 08/22/17 10:25 Dose:  100 mls/hr


Sodium Chloride (Normal Saline -)  1,000 mls @ 75 mls/hr IV ASDIR [facility]


   Last Admin: 08/22/17 12:50 Dose:  75 mls/hr


Insulin Aspart (Novolog Vial Sliding Scale -)  1 vial SQ ACHS [facility]


   PRN Reason: Protocol


   Last Admin: 08/22/17 12:47 Dose:  Not Given


Insulin Detemir (Levemir Vial)  44 units SQ HS [facility]


   Last Admin: 08/21/17 22:24 Dose:  44 unit


Ondansetron HCl (Zofran Injection)  4 mg IVPB Q6H PRN


   PRN Reason: NAUSEA


   Last Admin: 08/22/17 01:50 Dose:  4 mg


Oxycodone HCl (Roxicodone -)  5 mg PO Q4H PRN


   PRN Reason: PAIN


   Last Admin: 08/22/17 14:22 Dose:  5 mg


Ramipril (Altace -)  5 mg PO DAILY [facility]


   Last Admin: 08/22/17 10:24 Dose:  5 mg


Spironolactone (Aldactone -)  25 mg PO DAILY [facility]


   Last Admin: 08/22/17 10:25 Dose:  25 mg











- Objective


Vital Signs: 


 Vital Signs











Temperature  98.3 F   08/22/17 13:55


 


Pulse Rate  73   08/22/17 13:55


 


Respiratory Rate  18   08/22/17 13:55


 


Blood Pressure  146/67   08/22/17 13:55


 


O2 Sat by Pulse Oximetry (%)  99   08/22/17 09:00











Constitutional: Yes: No Distress, Calm


Cardiovascular: Yes: Regular Rate and Rhythm


Respiratory: Yes: Regular, CTA Bilaterally


Gastrointestinal: Yes: Normal Bowel Sounds, Soft


Genitourinary: Yes: Other (fungal infection noted in groin)


Musculoskeletal: Yes: WNL


Extremities: Yes: WNL


Wound/Incision: Yes: Other


Neurological: Yes: Alert, Oriented


Psychiatric: Yes: Alert, Oriented


Labs: 


 CBC, BMP





 08/22/17 06:00 





 08/22/17 06:00 





 INR, PTT











INR  1.27  (0.82-1.09)  H  08/16/17  18:35    














Assessment/Plan


patient evaluated


patient has labial abscess





left labial abscess


dm





plan


cx result noted


patient can be discharged home on clinda 300 mg 3 times a day for 5 more days


please prescribe antifungal for her groin


rest as per primary

## 2017-08-23 VITALS — TEMPERATURE: 98 F | HEART RATE: 62 BPM | SYSTOLIC BLOOD PRESSURE: 162 MMHG | DIASTOLIC BLOOD PRESSURE: 66 MMHG

## 2017-08-23 LAB
ALBUMIN SERPL-MCNC: 2.5 G/DL (ref 3.4–5)
ALP SERPL-CCNC: 50 U/L (ref 45–117)
ALT SERPL-CCNC: 18 U/L (ref 12–78)
ANION GAP SERPL CALC-SCNC: 9 MMOL/L (ref 8–16)
AST SERPL-CCNC: 12 U/L (ref 15–37)
BILIRUB SERPL-MCNC: 0.4 MG/DL (ref 0.2–1)
CALCIUM SERPL-MCNC: 7.5 MG/DL (ref 8.5–10.1)
CO2 SERPL-SCNC: 24 MMOL/L (ref 21–32)
CREAT SERPL-MCNC: 0.6 MG/DL (ref 0.55–1.02)
DEPRECATED RDW RBC AUTO: 13.8 % (ref 11.6–15.6)
GLUCOSE SERPL-MCNC: 82 MG/DL (ref 74–106)
MCH RBC QN AUTO: 30 PG (ref 25.7–33.7)
MCHC RBC AUTO-ENTMCNC: 33.6 G/DL (ref 32–36)
MCV RBC: 89.3 FL (ref 80–96)
PLATELET # BLD AUTO: 224 K/MM3 (ref 134–434)
PLATELET # BLD EST: ADEQUATE 10*3/UL
PMV BLD: 7.2 FL (ref 7.5–11.1)
PROT SERPL-MCNC: 5.8 G/DL (ref 6.4–8.2)
TOTAL CELLS COUNTED BLD: 100
WBC # BLD AUTO: 9.6 K/MM3 (ref 4–10)

## 2017-08-23 RX ADMIN — INSULIN ASPART SCH: 100 INJECTION, SOLUTION INTRAVENOUS; SUBCUTANEOUS at 08:29

## 2017-08-23 RX ADMIN — RAMIPRIL SCH MG: 5 CAPSULE ORAL at 09:51

## 2017-08-23 RX ADMIN — CARVEDILOL SCH MG: 12.5 TABLET, FILM COATED ORAL at 09:52

## 2017-08-23 RX ADMIN — PIPERACILLIN SODIUM,TAZOBACTAM SODIUM SCH MLS/HR: 3; .375 INJECTION, POWDER, FOR SOLUTION INTRAVENOUS at 01:05

## 2017-08-23 RX ADMIN — INSULIN ASPART SCH: 100 INJECTION, SOLUTION INTRAVENOUS; SUBCUTANEOUS at 11:11

## 2017-08-23 RX ADMIN — PIPERACILLIN SODIUM,TAZOBACTAM SODIUM SCH MLS/HR: 3; .375 INJECTION, POWDER, FOR SOLUTION INTRAVENOUS at 09:52

## 2017-08-23 RX ADMIN — SPIRONOLACTONE SCH MG: 25 TABLET, FILM COATED ORAL at 09:51

## 2017-08-23 NOTE — DS
Physical Examination


Vital Signs: 


 Vital Signs











Temperature  97.6 F   08/23/17 06:00


 


Pulse Rate  64   08/23/17 06:00


 


Respiratory Rate  14   08/23/17 06:00


 


Blood Pressure  148/68   08/23/17 06:00


 


O2 Sat by Pulse Oximetry (%)  99   08/22/17 22:00











Labs: 


 CBC, BMP





 08/23/17 06:00 





 08/23/17 05:50 











Discharge Summary


Reason For Visit: ABSCESS OF LABIA MAJORA/DIABETES MELLITU


Current Active Problems





Abscess of labia majora (Acute) 


Abscess of left genital labia (Acute) 


Coronary artery disease (Acute) 


Diabetes (Acute) 


Uncontrolled diabetes mellitus (Acute) 








Condition: Stable





- Instructions


Referrals: 


Yuly Beck MD [Primary Care Provider] - 


Danny Fernandes MD [Staff Physician] - 





- Home Medications


Comprehensive Discharge Medication List: 


Ambulatory Orders





Atorvastatin Ca [Lipitor] 40 mg PO HS 07/22/16 


Carvedilol 12.5 mg PO BID 07/22/16 


Insulin Aspart [Novolog Flexpen] 14 unit SQ TID 07/22/16 


Insulin Glargine,Hum.rec.anlog [Lantus Solostar PEN (NF)] 32 units SQ HS 07/22/ 16 


Ramipril 5 mg PO DAILY 07/22/16 


Spironolactone 25 mg PO DAILY 07/22/16 


Acetaminophen [Tylenol .Regular Strength -] 650 mg PO Q6H PRN #0 tablet 08/22/ 17 


Carvedilol [Coreg -] 12.5 mg PO BID  tablet 08/22/17 


Insulin (Levemir) [Levemir Vial] 44 units SQ HS  ml 08/22/17 


Levofloxacin [Levaquin] 750 mg PO DAILY #5 tab 08/22/17 


Oxycodone HCl [Roxicodone -] 5 mg PO Q4H PRN #20 tablet MDD 4 08/22/17 


Ramipril [Altace] 5 mg PO DAILY #30 tab 08/22/17 


Spironolactone [Aldactone -] 25 mg PO DAILY  tablet 08/22/17

## 2017-09-13 ENCOUNTER — HOSPITAL ENCOUNTER (INPATIENT)
Dept: HOSPITAL 74 - JER | Age: 72
LOS: 3 days | Discharge: HOME | DRG: 309 | End: 2017-09-16
Attending: INTERNAL MEDICINE | Admitting: INTERNAL MEDICINE
Payer: COMMERCIAL

## 2017-09-13 VITALS — BODY MASS INDEX: 29.5 KG/M2

## 2017-09-13 DIAGNOSIS — I10: ICD-10-CM

## 2017-09-13 DIAGNOSIS — R79.89: ICD-10-CM

## 2017-09-13 DIAGNOSIS — D64.9: ICD-10-CM

## 2017-09-13 DIAGNOSIS — E86.0: ICD-10-CM

## 2017-09-13 DIAGNOSIS — I25.5: ICD-10-CM

## 2017-09-13 DIAGNOSIS — R06.00: ICD-10-CM

## 2017-09-13 DIAGNOSIS — I25.10: ICD-10-CM

## 2017-09-13 DIAGNOSIS — I47.1: Primary | ICD-10-CM

## 2017-09-13 DIAGNOSIS — R07.89: ICD-10-CM

## 2017-09-13 DIAGNOSIS — Z95.1: ICD-10-CM

## 2017-09-13 DIAGNOSIS — G62.9: ICD-10-CM

## 2017-09-13 DIAGNOSIS — N17.9: ICD-10-CM

## 2017-09-13 DIAGNOSIS — E11.65: ICD-10-CM

## 2017-09-13 LAB
ALBUMIN SERPL-MCNC: 4 G/DL (ref 3.4–5)
ALP SERPL-CCNC: 64 U/L (ref 45–117)
ALT SERPL-CCNC: 25 U/L (ref 12–78)
ANION GAP SERPL CALC-SCNC: 8 MMOL/L (ref 8–16)
APPEARANCE UR: CLEAR
AST SERPL-CCNC: 13 U/L (ref 15–37)
BASOPHILS # BLD: 0.7 % (ref 0–2)
BILIRUB SERPL-MCNC: 0.6 MG/DL (ref 0.2–1)
BILIRUB UR STRIP.AUTO-MCNC: NEGATIVE MG/DL
CALCIUM SERPL-MCNC: 9.6 MG/DL (ref 8.5–10.1)
CK SERPL-CCNC: 106 IU/L (ref 26–192)
CK SERPL-CCNC: 123 IU/L (ref 26–192)
CO2 SERPL-SCNC: 23 MMOL/L (ref 21–32)
COLOR UR: (no result)
CREAT SERPL-MCNC: 1.1 MG/DL (ref 0.55–1.02)
DEPRECATED RDW RBC AUTO: 14.8 % (ref 11.6–15.6)
EOSINOPHIL # BLD: 4.6 % (ref 0–4.5)
GLUCOSE SERPL-MCNC: 169 MG/DL (ref 74–106)
INR BLD: 1.16 (ref 0.82–1.09)
KETONES UR QL STRIP: NEGATIVE
LEUKOCYTE ESTERASE UR QL STRIP.AUTO: NEGATIVE
MCH RBC QN AUTO: 30.5 PG (ref 25.7–33.7)
MCHC RBC AUTO-ENTMCNC: 33.4 G/DL (ref 32–36)
MCV RBC: 91.3 FL (ref 80–96)
NEUTROPHILS # BLD: 39.6 % (ref 42.8–82.8)
NITRITE UR QL STRIP: NEGATIVE
PH UR: 5 [PH] (ref 5–8)
PLATELET # BLD AUTO: 175 K/MM3 (ref 134–434)
PMV BLD: 8.4 FL (ref 7.5–11.1)
PROT SERPL-MCNC: 7.7 G/DL (ref 6.4–8.2)
PROT UR QL STRIP: NEGATIVE
PROT UR QL STRIP: NEGATIVE
PT PNL PPP: 12.8 SEC (ref 9.98–11.88)
RBC # UR STRIP: NEGATIVE /UL
SP GR UR: 1.01 (ref 1–1.02)
TROPONIN I SERPL-MCNC: < 0.02 NG/ML (ref 0–0.05)
TROPONIN I SERPL-MCNC: < 0.02 NG/ML (ref 0–0.05)
TSH SERPL-ACNC: 1.21 UIU/ML (ref 0.36–3.74)
UROBILINOGEN UR STRIP-MCNC: NEGATIVE MG/DL (ref 0.2–1)
WBC # BLD AUTO: 5.8 K/MM3 (ref 4–10)

## 2017-09-13 RX ADMIN — ENOXAPARIN SODIUM SCH MG: 80 INJECTION SUBCUTANEOUS at 22:32

## 2017-09-13 RX ADMIN — CARVEDILOL SCH MG: 12.5 TABLET, FILM COATED ORAL at 22:32

## 2017-09-13 RX ADMIN — INSULIN DETEMIR SCH UNITS: 100 INJECTION, SOLUTION SUBCUTANEOUS at 22:49

## 2017-09-13 RX ADMIN — ATORVASTATIN CALCIUM SCH MG: 40 TABLET, FILM COATED ORAL at 22:32

## 2017-09-13 NOTE — PDOC
History of Present Illness





<Calixto Rivero - Last Filed: 09/13/17 23:26>





- General


History Source: Patient


Exam Limitations: No Limitations





<Johanna Payne - Last Filed: 09/13/17 23:45>





- General


Chief Complaint: Tachycardia


Stated Complaint: CHEST PAIN


Time Seen by Provider: 09/13/17 18:23





- History of Present Illness


Initial Comments: 


09/13/17 19:05


The patient is a 72 year old female, with a significant past medical history of 

h/o triple bypass (3 years ago), HNT, peripheral neuropathy and IDDM, who 

presents to the emergency department sent by Dr. Hernandez for ECG changes. 

Patient notes that she saw her PMD Dr. Soria today for a routine visit who 

performed an ECG and had a rate of 130 and he prompted her to be evaluated by 

Dr. Hernandez who also found ECG changes, sinus tachycardia, and prompted her to 

present to the ED. Patient reports chest pressure on monday. Patient also 

reports increased SOB for few months. Patient reports h/o of aspirin use after 

her triple bypass surgery but denies any use now. Patient denies any chest pain 

or palpitations now. 


No fever, chills, nausea and vomiting. No new leg swelling. 


Her  reports recent travel to [place] for 2-3 hours but denies any 

long travel. 


Sh - former smoker, no alcohol use, no IVDU





Allergies: none


Past surgical history: Brain aneurysm (1992), triple bypass 2015


Social history: former smoker 


PCP - Dr. Soria


Cardiologist - Dr. Hernandez (Johanna Payne)








Past History





- Past Medical History


Anemia: No


Asthma: No


Cancer: No


Cardiac Disorders: Yes


CVA: No


COPD: No


CHF: No


Dementia: No


Diabetes: Yes (IDDM)


GI Disorders: No


 Disorders: No


HTN: Yes


Hypercholesterolemia: Yes


Liver Disease: No


Seizures: Yes (brain aneurysm (1992))


Thyroid Disease: No





- Surgical History


Cardiac Surgery: Yes (triple bypass 2016)


Neurologic Surgery: Yes (CLIPPING OF ANEURYSM 1992)





- Psycho/Social/Smoking Cessation Hx


Anxiety: No


Suicidal Ideation: No


Smoking Status: No


Smoking History: Never smoked


Have you smoked in the past 12 months: No


Number of Cigarettes Smoked Daily: 60


If you are a former smoker, when did you quit?: 1972


Hx Alcohol Use: No


Drug/Substance Use Hx: No


Substance Use Type: None


Hx Substance Use Treatment: No





<Calixto Rivero - Last Filed: 09/13/17 23:26>





<Johanna Payne - Last Filed: 09/13/17 23:45>





- Past Medical History


Allergies/Adverse Reactions: 


 Allergies











Allergy/AdvReac Type Severity Reaction Status Date / Time


 


No Known Allergies Allergy   Verified 09/13/17 17:55











Home Medications: 


Ambulatory Orders





Atorvastatin Ca [Lipitor] 40 mg PO HS 07/22/16 


Insulin Aspart [Novolog Flexpen] 20 unit SQ TID 07/22/16 


Ramipril 5 mg PO DAILY 07/22/16 


Acetaminophen [Tylenol .Regular Strength -] 650 mg PO Q6H PRN #0 tablet 08/22/ 17 


Spironolactone [Aldactone -] 25 mg PO DAILY  tablet 08/22/17 


Carvedilol [Coreg -] 12.5 mg PO BID 09/13/17 


Gabapentin 100 mg PO DAILY 09/13/17 


Insulin Glargine,Hum.rec.anlog [Lantus Solostar PEN (NF)] 40 units SQ HS 09/13/ 17 


Levomefolate/B6/B12/Algal Oil [Metanx Capsule] 1 each PO DAILY 09/13/17 











**Review of Systems





<Calixto Rivero - Last Filed: 09/13/17 23:26>





- Review of Systems


Able to Perform ROS?: Yes





<Johanna Payne - Last Filed: 09/13/17 23:45>





- Review of Systems


Comments:: 


09/13/17 19:06


CONSTITUTIONAL: No fever, no chills, no fatigue


EYES: No visual changes


ENT: No ear pain, no sore throat


CARDIOVASCULAR: No chest pain, no palpitations


RESPIRATORY: No cough, no SOB


GI: No abdominal pain, no nausea, no vomiting, no constipation, no diarrhea


GENITOURINARY: No dysuria, no frequency, no hematuria


MUSKULOSKELETAL: No backpain, no joint pain, no myalgias


SKIN: No rash


NEURO: No headache


 (Johanna Payne)








*Physical Exam





<Calixto Rivero - Last Filed: 09/13/17 23:26>





<Johanna Payne - Last Filed: 09/13/17 23:45>





- Vital Signs


 Last Vital Signs











Temp Pulse Resp BP Pulse Ox


 


 99.2 F   121 H  20   110/68   97 


 


 09/13/17 19:00  09/13/17 21:00  09/13/17 21:00  09/13/17 21:00  09/13/17 21:00

















- Physical Exam


Comments: 


09/13/17 19:06


CONSTITUTIONAL: Well-appearing; well-nourished; in no apparent distress


HEAD: Normocephalic; atraumatic


EYES: PERRL; EOM intact


ENMT: External appears normal; normal oropharynx


NECK: Supple; nontender; no cervical lymphadenopathy


CARD:  (+)tachycardia. Normal S1, S2; no murmurs, rubs, or gallops


RESP: Normal chest excursion with respiration; breath sounds clear and equal 

bilaterally; no wheezes, rhonchi, or rales


ABD: Soft, non-distended; non-tender; no palpable organomegaly, no palpable 

hernias


EXT: (+) +1 pitting edema bilaterally. Normal ROM in all four extremities; non-

tender to palpation; distal pulses intact


SKIN: Warm, dry, no rash


NEURO: No focal neurological deficiencies.


 (Johanna Payne)








ED Treatment Course





- LABORATORY


CBC & Chemistry Diagram: 


 09/13/17 19:00





 09/13/17 19:00





<Calixto Rivero - Last Filed: 09/13/17 23:26>





- LABORATORY


CBC & Chemistry Diagram: 


 09/13/17 19:00





 09/13/17 19:00





<Johanna Payne - Last Filed: 09/13/17 23:45>





- ADDITIONAL ORDERS


Additional order review: 


 Laboratory  Results











  09/13/17 09/13/17 09/13/17





  21:55 20:44 19:15


 


INR   


 


D-Dimer   


 


Sodium   


 


Potassium   


 


Chloride   


 


Carbon Dioxide   


 


Anion Gap   


 


BUN   


 


Creatinine   


 


Creat Clearance w eGFR   


 


Random Glucose   


 


Calcium   


 


Total Bilirubin   


 


AST   


 


ALT   


 


Alkaline Phosphatase   


 


Creatine Kinase  106  


 


Troponin I  < 0.02  


 


Total Protein   


 


Albumin   


 


TSH   


 


Urine Color    Ltyellow


 


Urine Appearance    Clear


 


Urine pH    5.0


 


Ur Specific Gravity    1.015


 


Urine Protein    Negative


 


Urine Glucose (UA)    Negative


 


Urine Ketones    Negative


 


Urine Blood    Negative


 


Urine Nitrite    Negative


 


Urine Bilirubin    Negative


 


Urine Urobilinogen    Negative


 


Stool Occult Blood   Negative 














  09/13/17 09/13/17 09/13/17





  19:00 19:00 19:00


 


INR    1.16 H


 


D-Dimer  735 H  


 


Sodium   138 


 


Potassium   5.2 H D 


 


Chloride   107 


 


Carbon Dioxide   23 


 


Anion Gap   8 


 


BUN   63 H D 


 


Creatinine   1.1 H D 


 


Creat Clearance w eGFR   48.82 


 


Random Glucose   169 H D 


 


Calcium   9.6  D 


 


Total Bilirubin   0.6  D 


 


AST   13 L 


 


ALT   25  D 


 


Alkaline Phosphatase   64  D 


 


Creatine Kinase   123 


 


Troponin I   < 0.02 


 


Total Protein   7.7  D 


 


Albumin   4.0  D 


 


TSH   1.21  D 


 


Urine Color   


 


Urine Appearance   


 


Urine pH   


 


Ur Specific Gravity   


 


Urine Protein   


 


Urine Glucose (UA)   


 


Urine Ketones   


 


Urine Blood   


 


Urine Nitrite   


 


Urine Bilirubin   


 


Urine Urobilinogen   


 


Stool Occult Blood   








 











  09/13/17





  19:00


 


RBC  4.06  D


 


MCV  91.3


 


MCHC  33.4


 


RDW  14.8


 


MPV  8.4  D


 


Neutrophils %  39.6 L D


 


Lymphocytes %  44.8 H D


 


Monocytes %  10.3 H


 


Eosinophils %  4.6 H


 


Basophils %  0.7

















- Medications


Given in the ED: 


ED Medications














Discontinued Medications














Generic Name Dose Route Start Last Admin





  Trade Name Freq  PRN Reason Stop Dose Admin


 


Acetaminophen  650 mg 09/13/17 23:20 09/13/17 23:36





  Tylenol -  PO 09/13/17 23:21  650 mg





  ONCE ONE   Administration


 


Sodium Chloride  250 mls @ 500 mls/hr 09/13/17 20:24 09/13/17 19:15





  Normal Saline -  IV 09/13/17 20:53  500 mls/hr





  ASDIR STA   Administration

















Medical Decision Making





<Calixto Rivero - Last Filed: 09/13/17 23:26>





<Johanna Payne - Last Filed: 09/13/17 23:45>





- Medical Decision Making


09/13/17 21:33





Patient is a 72-year-old female with history of ischemic cardiomyopathy, low EF

, diabetes, hypertension who presents the ER with sinus tachycardia from her 

cardiologist's office without associated symptoms. Patient endorses that she 

had chest discomfort at rest several days prior to arrival. Initial evaluation, 

patient is asymptomatic with heart rate of 127 and EKG revealing sinus 

tachycardia with borderline first-degree AV block and Q waves in the inferior 

leads which were present no previous EKG. CBC revealed no evidence of 

leukocytosis and a hematocrit of 37. CMP revealed significantly elevated BUN to 

creatinine ratio. First set of cardiac enzymes was within normal limit. Chest x-

ray revealed no evidence of infiltrate or effusion, borderline cardiomegaly is 

noted. TSH is noted to be within normal limit as well. Patient was noted to be 

afebrile. Patient is noted to be low probability for PE and d-dimer was 

obtained which is noted to be 735. Differential diagnosis for patient's atrial 

tachycardia includes dehydration likely related to diuretic therapy versus PE 

versus euthyroid hypothyroidism. We'll judiciously hydrate with normal saline 

boluses. Will rule out lower extremity DVT by Doppler ultrasound. I am unable 

to obtain CTA of chest with this time due to acute kidney injury. 

Administration of Lovenox at this time for treatment of possible PE outweighs 

the risk of bleeding. We'll administer subcutaneous Lovenox pending stool 

guaiac. Will admit to telemetry further evaluation and treatment. Case 

discussed with Dr. Hernandez of cardiology who agrees with the plan of care.














09/13/17 23:26


Patient is guaiac negative. Lower externally Doppler ultrasound shows no 

evidence of DVT. However, patient complains of severe right-sided headache. 

Will obtain stat head CT to rule out intracerebral hemorrhage. (Calixto Rivero)


09/13/17 20:28


A call was placed to Dr. Hernandez at his service. Awaiting a call back.





09/13/17 20:52


A second call was placed to Dr. Hernandez at his service. Case discussed. 





09/13/17 23:45


Head CT is read as negative for acute pathology. (Johanna Payne)








*DC/Admit/Observation/Transfer





- Discharge Dispostion


Admit: Yes





<Calixto Rivero - Last Filed: 09/13/17 23:26>





<Johanna Payne - Last Filed: 09/13/17 23:45>


Diagnosis at time of Disposition: 


 Sinus tachycardia, MOLLY (acute kidney injury), Dehydration, Elevated d-dimer





Dyspnea


Qualifiers:


 Dyspnea type: unspecified Qualified Code(s): R06.00 - Dyspnea, unspecified





- Referrals





- Attestations


Scribe Attestion: 


09/13/17 19:08


Documentation prepared by OMERO Goldstein, acting as medical scribe for 

Calixto Rivero MD. (Johanna Payne)





Physician Attestion: 





09/13/17 23:14


The documentation was prepared by the scribe under my direct supervision. I 

have reviewed the documentation which correctly represents the findings, 

medical decision-making and critical action taken by me. (Calixto Rivero)

## 2017-09-14 LAB
ALBUMIN SERPL-MCNC: 3.3 G/DL (ref 3.4–5)
ALP SERPL-CCNC: 45 U/L (ref 45–117)
ALT SERPL-CCNC: 21 U/L (ref 12–78)
ANION GAP SERPL CALC-SCNC: 8 MMOL/L (ref 8–16)
ANION GAP SERPL CALC-SCNC: 8 MMOL/L (ref 8–16)
AST SERPL-CCNC: 11 U/L (ref 15–37)
BASOPHILS # BLD: 0.8 % (ref 0–2)
BILIRUB SERPL-MCNC: 0.6 MG/DL (ref 0.2–1)
CALCIUM SERPL-MCNC: 8.5 MG/DL (ref 8.5–10.1)
CALCIUM SERPL-MCNC: 8.6 MG/DL (ref 8.5–10.1)
CK SERPL-CCNC: 93 IU/L (ref 26–192)
CK SERPL-CCNC: 94 IU/L (ref 26–192)
CK SERPL-CCNC: 97 IU/L (ref 26–192)
CO2 SERPL-SCNC: 22 MMOL/L (ref 21–32)
CO2 SERPL-SCNC: 23 MMOL/L (ref 21–32)
CREAT SERPL-MCNC: 0.8 MG/DL (ref 0.55–1.02)
CREAT SERPL-MCNC: 1 MG/DL (ref 0.55–1.02)
DEPRECATED RDW RBC AUTO: 14.9 % (ref 11.6–15.6)
EOSINOPHIL # BLD: 5.6 % (ref 0–4.5)
GLUCOSE SERPL-MCNC: 249 MG/DL (ref 74–106)
GLUCOSE SERPL-MCNC: 91 MG/DL (ref 74–106)
MCH RBC QN AUTO: 30 PG (ref 25.7–33.7)
MCHC RBC AUTO-ENTMCNC: 33.3 G/DL (ref 32–36)
MCV RBC: 90.1 FL (ref 80–96)
NEUTROPHILS # BLD: 29.4 % (ref 42.8–82.8)
PLATELET # BLD AUTO: 148 K/MM3 (ref 134–434)
PMV BLD: 7.5 FL (ref 7.5–11.1)
PROT SERPL-MCNC: 6.4 G/DL (ref 6.4–8.2)
T4 FREE SERPL-MCNC: 0.9 NG/DL (ref 0.76–1.46)
TROPONIN I SERPL-MCNC: < 0.02 NG/ML (ref 0–0.05)
TSH SERPL-ACNC: 1.58 UIU/ML (ref 0.36–3.74)
WBC # BLD AUTO: 5.6 K/MM3 (ref 4–10)

## 2017-09-14 RX ADMIN — ATORVASTATIN CALCIUM SCH MG: 40 TABLET, FILM COATED ORAL at 22:15

## 2017-09-14 RX ADMIN — CARVEDILOL SCH MG: 12.5 TABLET, FILM COATED ORAL at 10:47

## 2017-09-14 RX ADMIN — INSULIN DETEMIR SCH UNITS: 100 INJECTION, SOLUTION SUBCUTANEOUS at 22:18

## 2017-09-14 RX ADMIN — INSULIN ASPART SCH: 100 INJECTION, SOLUTION INTRAVENOUS; SUBCUTANEOUS at 10:56

## 2017-09-14 RX ADMIN — GABAPENTIN SCH MG: 100 CAPSULE ORAL at 10:47

## 2017-09-14 RX ADMIN — INSULIN ASPART SCH: 100 INJECTION, SOLUTION INTRAVENOUS; SUBCUTANEOUS at 07:37

## 2017-09-14 RX ADMIN — SODIUM CHLORIDE SCH MLS/HR: 4.5 INJECTION, SOLUTION INTRAVENOUS at 10:47

## 2017-09-14 RX ADMIN — METOPROLOL TARTRATE SCH MG: 25 TABLET, FILM COATED ORAL at 22:15

## 2017-09-14 RX ADMIN — ENOXAPARIN SODIUM SCH MG: 80 INJECTION SUBCUTANEOUS at 22:19

## 2017-09-14 RX ADMIN — INSULIN ASPART SCH UNIT: 100 INJECTION, SOLUTION INTRAVENOUS; SUBCUTANEOUS at 17:14

## 2017-09-14 NOTE — HP
Admitting History and Physical





- Primary Care Physician


PCP: Alexander Soria





- Admission


Chief Complaint: palpitations


History of Present Illness: 


ER HISTORY   


- History of Present Illness


Initial Comments: 


09/13/17 19:05


The patient is a 72 year old female, with a significant past medical history of 

h/o triple bypass (3 years ago), HNT, peripheral neuropathy and IDDM, who 

presents to the emergency department sent by Dr. Pagan for ECG changes. 

Patient notes that she saw her PMD Dr. Soria today for a routine visit who 

performed an ECG and had a rate of 130 and he prompted her to be evaluated by 

Dr. Pagan who also found ECG changes, sinus tachycardia, and prompted her to 

present to the ED. Patient reports chest pressure on monday. Patient also 

reports increased SOB for few months. Patient reports h/o of aspirin use after 

her triple bypass surgery but denies any use now. Patient denies any chest pain 

or palpitations now. 


No fever, chills, nausea and vomiting. No new leg swelling. 


Her  reports recent travel to [place] for 2-3 hours but denies any 

long travel. 





Pt seen and examined in ER- son at bedside


As per son, pt has been feeling chest pressure and SOB for many months now. She 

lives with  and son says that she is unable to climb stairs and does not 

ambulate much in and around the house due to SOB. She has trouble sleeping. She 

wakes up in the middle of night but denies PND. 


No orthopnea-- uses one pillow to sleep. 


Yesterday, she came to see Dr Soria for routine check up and to establish care 

as she was new to the practice and EKG showed -- sinus tachycardia 130 bpm-- 

she went to see Dr pagan - cardiology - afterwards and was advised to come to 

ER for evaluation. 


She does not have much blood sugar control per son as she is non compliant with 

diet. She received iv fluids and one dose of Lovenox - to R/o PE as D dimer was 

positive, but sono legs negative for DVT


She has h/o CABG- 2016, ischemic cardiomyopathy, neuropathy, low EF, 

uncontrolled DM


History Source: Patient, Family Member


Limitations to Obtaining History: No Limitations





- Past Medical History


CNS: Yes: Other (aneurysm s/p slipping, Left  shunt)


Cardiovascular: Yes: CAD (s/p CABG-2016), CHF, HTN, Hyperlipdemia


Endocrine: Yes: Diabetes Mellitus





- Past Surgical History


Past Surgical History: Yes: Bypass





- Smoking History


Smoking history: Never smoked


Have you smoked in the past 12 months: No


Aproximately how many cigarettes per day: 60


If you are a former smoker, when did you quit?: 1972





- Alcohol/Substance Use


Hx Alcohol Use: No





Home Medications





- Allergies


Allergies/Adverse Reactions: 


 Allergies











Allergy/AdvReac Type Severity Reaction Status Date / Time


 


No Known Allergies Allergy   Verified 09/13/17 17:55














- Home Medications


Home Medications: 


Ambulatory Orders





Atorvastatin Ca [Lipitor] 40 mg PO HS 07/22/16 


Insulin Aspart [Novolog Flexpen] 20 unit SQ TID 07/22/16 


Ramipril 5 mg PO DAILY 07/22/16 


Acetaminophen [Tylenol .Regular Strength -] 650 mg PO Q6H PRN #0 tablet 08/22/ 17 


Spironolactone [Aldactone -] 25 mg PO DAILY  tablet 08/22/17 


Carvedilol [Coreg -] 12.5 mg PO BID 09/13/17 


Gabapentin 100 mg PO DAILY 09/13/17 


Insulin Glargine,Hum.rec.anlog [Lantus Solostar PEN (NF)] 40 units SQ HS 09/13/ 17 


Levomefolate/B6/B12/Algal Oil [Metanx Capsule] 1 each PO DAILY 09/13/17 











Review of Systems





- Review of Systems


Constitutional: denies: Chills, Fever


Cardiovascular: reports: Chest Pain, Shortness of Breath.  denies: Palpitations


Respiratory: reports: Exercise Intolerance, SOB, SOB on Exertion.  denies: Cough

, Orthopnea, PND





Physical Examination


Vital Signs: 


 Vital Signs











Temperature  98.4 F   09/14/17 07:39


 


Pulse Rate  74   09/14/17 07:39


 


Respiratory Rate  16   09/14/17 07:39


 


Blood Pressure  100/88   09/14/17 07:39


 


O2 Sat by Pulse Oximetry (%)  98   09/14/17 07:39











Constitutional: Yes: No Distress, Calm


Cardiovascular: Yes: Regular Rate and Rhythm, Other (sternal scar)


Respiratory: Yes: Diminished.  No: Rales, Rhonchi


Gastrointestinal: Yes: Normal Bowel Sounds, Soft, Abdomen, Obese.  No: 

Distention, Tenderness


Edema: No


Psychiatric: Yes: Alert, Oriented


Labs: 


 CBC, BMP





 09/14/17 06:20 





 09/14/17 06:20 











Imaging





- Results


Chest X-ray: Image Reviewed (no infiltrate or congestion)


Cat Scan: Report Reviewed


EKG: Image Reviewed (sinus tachycardia)





Problem List





- Problems


(1) MOLLY (acute kidney injury)


Code(s): N17.9 - ACUTE KIDNEY FAILURE, UNSPECIFIED





(2) Dehydration


Code(s): E86.0 - DEHYDRATION





(3) Dyspnea


Code(s): R06.00 - DYSPNEA, UNSPECIFIED   Qualifiers: 


     Dyspnea type: unspecified     Qualified Code(s): R06.00 - Dyspnea, 

unspecified  





(4) Elevated d-dimer


Code(s): R79.89 - OTHER SPECIFIED ABNORMAL FINDINGS OF BLOOD CHEMISTRY





(5) Sinus tachycardia


Code(s): R00.0 - TACHYCARDIA, UNSPECIFIED





(6) Coronary artery disease


Code(s): I25.10 - ATHSCL HEART DISEASE OF NATIVE CORONARY ARTERY W/O ANG PCTRS 

  Qualifiers: 


     Coronary Disease-Associated Artery/Lesion type: unspecified vessel or 

lesion type     Native vs. transplanted heart: native heart     Associated 

angina: without angina        Qualified Code(s): I25.10 - Atherosclerotic heart 

disease of native coronary artery without angina pectoris  





(7) Diabetes


Code(s): E11.9 - TYPE 2 DIABETES MELLITUS WITHOUT COMPLICATIONS   Qualifiers: 


     Diabetes mellitus type: other specified (including TRACY)     Diabetes 

mellitus complication status: with skin complications     Diabetes mellitus 

complication detail: with other skin complication     Diabetes mellitus long 

term insulin use: with long term use        Qualified Code(s): E13.628 - Other 

specified diabetes mellitus with other skin complications; Z79.4 - Long term (

current) use of insulin  





(8) HTN (hypertension)


Code(s): I10 - ESSENTIAL (PRIMARY) HYPERTENSION   Qualifiers: 


     Hypertension type: essential hypertension        Qualified Code(s): I10 - 

Essential (primary) hypertension  








Assessment/Plan


PLAN


Received one dose of Lovenox as D Dimer was high-- will get CT chest with 

contrast today to R/O PE  as her GFR >60 now


Will need iv fluids for another 24 hours-- looks dehydrated also , caution for 

fluid overload with iv fluids


CXR clear, lung exam benign


cardiac enzymes negative


Cardiology evaluation-- may need stress test


TSH normal


HR is better controlled with fluids and Coreg 


Monitor BGM-- will give insulin accordingly


check A1C


check lipid panel, electrolytes





Time spent-- 40 min-- includes documentation, examination assessment and plan 

and discussion with son at bedside

## 2017-09-14 NOTE — PN
Progress Note (short form)





- Note


Progress Note: 


Refer to consult of 09-14-17.in attendance,till 9:50pm.

## 2017-09-14 NOTE — PN
Progress Note (short form)





- Note


Progress Note: 


Noted that BUN is 46 but GFR >60 , normal creatinine, may go ahead with CT 

chest with iv contrast , but needs iv fluids afterwards.

## 2017-09-14 NOTE — EKG
Test Reason : 

Blood Pressure : ***/*** mmHG

Vent. Rate : 126 BPM     Atrial Rate : 126 BPM

   P-R Int : 188 ms          QRS Dur : 076 ms

    QT Int : 342 ms       P-R-T Axes : -25 -34 019 degrees

   QTc Int : 495 ms

 

SINUS TACHYCARDIA

LEFT AXIS DEVIATION

ANTEROSEPTAL INFARCT , AGE UNDETERMINED

ABNORMAL ECG

WHEN COMPARED WITH ECG OF 16-AUG-2017 18:29,

VENT. RATE HAS INCREASED BY  44 BPM

ANTEROSEPTAL INFARCT IS NOW PRESENT

Confirmed by KEYONA BARNETT MD ([id number]) on 9/14/2017 1:13:16 PM

 

Referred By:             Confirmed By:KEYONA BARNETT MD

## 2017-09-15 LAB
ANION GAP SERPL CALC-SCNC: 7 MMOL/L (ref 8–16)
BASOPHILS # BLD: 0.6 % (ref 0–2)
CALCIUM SERPL-MCNC: 8.4 MG/DL (ref 8.5–10.1)
CHOLEST SERPL-MCNC: 111 MG/DL (ref 50–200)
CK SERPL-CCNC: 83 IU/L (ref 26–192)
CO2 SERPL-SCNC: 24 MMOL/L (ref 21–32)
CREAT SERPL-MCNC: 0.7 MG/DL (ref 0.55–1.02)
DEPRECATED RDW RBC AUTO: 14.3 % (ref 11.6–15.6)
EOSINOPHIL # BLD: 5.8 % (ref 0–4.5)
GLUCOSE SERPL-MCNC: 128 MG/DL (ref 74–106)
MAGNESIUM SERPL-MCNC: 2.2 MG/DL (ref 1.8–2.4)
MCH RBC QN AUTO: 30.6 PG (ref 25.7–33.7)
MCHC RBC AUTO-ENTMCNC: 33.9 G/DL (ref 32–36)
MCV RBC: 90.4 FL (ref 80–96)
NEUTROPHILS # BLD: 36.5 % (ref 42.8–82.8)
PLATELET # BLD AUTO: 142 K/MM3 (ref 134–434)
PMV BLD: 8 FL (ref 7.5–11.1)
T4 FREE SERPL-MCNC: 0.93 NG/DL (ref 0.76–1.46)
T4 SERPL-MCNC: 6.6 UG/DL (ref 4.8–13.9)
TROPONIN I SERPL-MCNC: < 0.02 NG/ML (ref 0–0.05)
TSH SERPL-ACNC: 1.81 UIU/ML (ref 0.36–3.74)
VIT B12 SERPL-MCNC: 993 PG/ML (ref 180–914)
WBC # BLD AUTO: 6.1 K/MM3 (ref 4–10)

## 2017-09-15 RX ADMIN — SODIUM CHLORIDE SCH MLS/HR: 4.5 INJECTION, SOLUTION INTRAVENOUS at 10:16

## 2017-09-15 RX ADMIN — GABAPENTIN SCH MG: 100 CAPSULE ORAL at 10:16

## 2017-09-15 RX ADMIN — HEPARIN SODIUM SCH UNIT: 5000 INJECTION, SOLUTION INTRAVENOUS; SUBCUTANEOUS at 22:31

## 2017-09-15 RX ADMIN — HEPARIN SODIUM SCH UNIT: 5000 INJECTION, SOLUTION INTRAVENOUS; SUBCUTANEOUS at 10:16

## 2017-09-15 RX ADMIN — INSULIN DETEMIR SCH UNITS: 100 INJECTION, SOLUTION SUBCUTANEOUS at 22:31

## 2017-09-15 RX ADMIN — INSULIN ASPART SCH: 100 INJECTION, SOLUTION INTRAVENOUS; SUBCUTANEOUS at 06:01

## 2017-09-15 RX ADMIN — ATORVASTATIN CALCIUM SCH MG: 40 TABLET, FILM COATED ORAL at 22:31

## 2017-09-15 RX ADMIN — METOPROLOL TARTRATE SCH MG: 50 TABLET, FILM COATED ORAL at 22:31

## 2017-09-15 RX ADMIN — INSULIN ASPART SCH UNIT: 100 INJECTION, SOLUTION INTRAVENOUS; SUBCUTANEOUS at 12:10

## 2017-09-15 RX ADMIN — METOPROLOL TARTRATE SCH MG: 25 TABLET, FILM COATED ORAL at 10:16

## 2017-09-15 RX ADMIN — INSULIN ASPART SCH: 100 INJECTION, SOLUTION INTRAVENOUS; SUBCUTANEOUS at 17:27

## 2017-09-15 NOTE — EKG
Test Reason : 

Blood Pressure : ***/*** mmHG

Vent. Rate : 114 BPM     Atrial Rate : 114 BPM

   P-R Int : 000 ms          QRS Dur : 082 ms

    QT Int : 374 ms       P-R-T Axes : 000 -15 014 degrees

   QTc Int : 515 ms

 

SINUS TACHYCARDIA

ANTEROSEPTAL INFARCT (CITED ON OR BEFORE 13-SEP-2017)

ABNORMAL ECG

WHEN COMPARED WITH ECG OF 13-SEP-2017 17:56,

NO SIGNIFICANT CHANGE WAS FOUND

Confirmed by LENA GIRALDO MD ([id number]) on 9/15/2017 9:39:09 AM

 

Referred By:             Confirmed By:LENA GIRALDO MD

## 2017-09-15 NOTE — PN
Progress Note (short form)





- Note


Progress Note: 


S: 72 year old female with history of CAD, s/p CABG, dilated ischemic 

cardiomyopathy, IDDM, HTN, peripheral neuropathy. History of reentrant 

supraventricular tachycardia. Was admitted with history of chest pain that 

occurred last  night and was seen by PCP yesterday and was found to have 

a rapid heartbeat compatible with reentrant supraventricular tachycardia. 

Patient was admitted through the Emergency Dept. and was found to be dehydrated 

and was placed on IV fluids and as the tachycardia persisted carotid massage 

was done on 17 and confirmed the presence of reentrant tachycardia. 


She was treated with IV cardizem and appeared to convert to sinus rhythm. She 

deneis any chest pain or discomfort, no dyspnea has been reported wither at 

rest of with exertion, no PND or orthopnea. Denies having palpitations. 


                   


Active Medications











Generic Name Dose Route Start Last Admin





  Trade Name Freq  PRN Reason Stop Dose Admin


 


Acetaminophen  650 mg 17 21:28  





  Tylenol -  PO   





  Q6H PRN   





  FEVER   


 


Atorvastatin Calcium  40 mg 17 22:00 17 22:15





  Lipitor -  PO   40 mg





  HS MYKE   Administration


 


Gabapentin  100 mg 17 10:00 09/15/17 10:16





  Neurontin -  PO   100 mg





  DAILY MYKE   Administration


 


Heparin Sodium (Porcine)  5,000 unit 09/15/17 10:00 09/15/17 10:16





  Heparin -  SQ   5,000 unit





  BID MYKE   Administration


 


Insulin Aspart  1 vial 17 07:00 09/15/17 06:01





  Novolog Vial Sliding Scale -  SQ   Not Given





  [facility]   





  Protocol   


 


Insulin Detemir  35 units 17 22:00 17 22:18





  Levemir Vial  SQ   35 units





  HS MYKE   Administration


 


Metoprolol Tartrate  25 mg 17 22:00 09/15/17 10:16





  Lopressor -  PO   25 mg





  BID MYKE   Administration














O: 72 year old female was in no acute distress, no pallor, cyanosis, clubbing, 

or jaundice. 





            


 Last Vital Signs











Temp Pulse Resp BP Pulse Ox


 


 98.1 F   76   20   129/66   99 


 


 09/15/17 06:00  09/15/17 06:00  09/15/17 06:00  09/15/17 06:00  09/15/17 06:00














Neck: Supple, no JVD, negative HJR, carotids were equal and upstrokes were 

normal, no thyromegaly appreciated. 


Heart: PMI was in the 5th intercostal space, no heaves or thrills, S1 and S2 

were normal. No murmurs or gallops were appreciated. 


Lungs: Clear on auscultation bilaterally. 


Abdomen: Soft, nontender, no hepatosplenomegaly appreciated, and no palpable 

masses were felt.


Extremities: No calf tenderness or dependent edema. Pulses are normal.








               


 CBC, BMP





 09/15/17 06:00 





 09/15/17 06:00 











             


 Laboratory Results - last 24 hr











  17





  12:11 15:45 15:45


 


WBC   


 


RBC   


 


Hgb   


 


Hct   


 


MCV   


 


MCH   


 


MCHC   


 


RDW   


 


Plt Count   


 


MPV   


 


Neutrophils %   


 


Lymphocytes %   


 


Monocytes %   


 


Eosinophils %   


 


Basophils %   


 


Sodium    137


 


Potassium    4.7


 


Chloride    106


 


Carbon Dioxide    23


 


Anion Gap    8


 


BUN    45 H


 


Creatinine    1.0  D


 


POC Glucometer   


 


Random Glucose    249 H D


 


Hemoglobin A1c %   


 


Calcium    8.6


 


Magnesium   


 


Creatine Kinase  93  94 


 


Troponin I  < 0.02  < 0.02 


 


Triglycerides   


 


Cholesterol   


 


Total LDL Cholesterol   


 


HDL Cholesterol   


 


Vitamin B12   


 


TSH   


 


Free T4   














  09/14/17 09/14/17 09/15/17





  17:01 22:17 05:51


 


WBC   


 


RBC   


 


Hgb   


 


Hct   


 


MCV   


 


MCH   


 


MCHC   


 


RDW   


 


Plt Count   


 


MPV   


 


Neutrophils %   


 


Lymphocytes %   


 


Monocytes %   


 


Eosinophils %   


 


Basophils %   


 


Sodium   


 


Potassium   


 


Chloride   


 


Carbon Dioxide   


 


Anion Gap   


 


BUN   


 


Creatinine   


 


POC Glucometer  [phone]  197  144


 


Random Glucose   


 


Hemoglobin A1c %   


 


Calcium   


 


Magnesium   


 


Creatine Kinase   


 


Troponin I   


 


Triglycerides   


 


Cholesterol   


 


Total LDL Cholesterol   


 


HDL Cholesterol   


 


Vitamin B12   


 


TSH   


 


Free T4   














  09/15/17 09/15/17 09/15/17





  06:00 06:00 06:00


 


WBC   6.1 


 


RBC   3.56 L 


 


Hgb   10.9 


 


Hct   32.2 L 


 


MCV   90.4 


 


MCH   30.6 


 


MCHC   33.9 


 


RDW   14.3 


 


Plt Count   142 


 


MPV   8.0 


 


Neutrophils %   36.5 L D 


 


Lymphocytes %   49.0 H 


 


Monocytes %   8.1 


 


Eosinophils %   5.8 H 


 


Basophils %   0.6 


 


Sodium  142  


 


Potassium  4.0  


 


Chloride  111 H  


 


Carbon Dioxide  24  


 


Anion Gap  7 L  


 


BUN  29 H D  


 


Creatinine  0.7  D  


 


POC Glucometer   


 


Random Glucose  128 H D  


 


Hemoglobin A1c %   


 


Calcium  8.4 L  


 


Magnesium  2.2  


 


Creatine Kinase  83  


 


Troponin I  < 0.02  


 


Triglycerides    207 H


 


Cholesterol    111


 


Total LDL Cholesterol    50


 


HDL Cholesterol    34 L


 


Vitamin B12    993 H


 


TSH  1.81  D  


 


Free T4  0.93  D  














  09/15/17





  06:00


 


WBC 


 


RBC 


 


Hgb 


 


Hct 


 


MCV 


 


MCH 


 


MCHC 


 


RDW 


 


Plt Count 


 


MPV 


 


Neutrophils % 


 


Lymphocytes % 


 


Monocytes % 


 


Eosinophils % 


 


Basophils % 


 


Sodium 


 


Potassium 


 


Chloride 


 


Carbon Dioxide 


 


Anion Gap 


 


BUN 


 


Creatinine 


 


POC Glucometer 


 


Random Glucose 


 


Hemoglobin A1c %  10.5 H


 


Calcium 


 


Magnesium 


 


Creatine Kinase 


 


Troponin I 


 


Triglycerides 


 


Cholesterol 


 


Total LDL Cholesterol 


 


HDL Cholesterol 


 


Vitamin B12 


 


TSH 


 


Free T4 








EC/15/17


Sinus rhythm with IACD, incomplete RBBB, slow R wave progression, ASWMI of 

indeterminate age cannot be excluded.





Impression:


1. Paroxysmal reentrant supraventricular tachycardia.


2. Coronary artery disease s/p CABG, 


3. Chest pain syndrome most likely related to angina pectoris.


4. History of ischemic cardiomyopathy.


5. Left ventricular systolic dysfunction.


6. Insulin dependent diabetes mellitus, poor controlled.


7. Peripheral neuropathy.


8. Anemia, etiology to be determined.


9. Hypertension.


10. S/p clipping of cerebral arterial aneurysm.





Recommendations: 


1. Increase dose of lopressor to 50 mg BID.


2. Stricter control of DM.


3. Increase ambulation.


4. Continue cardiac medications as outlined.





Prognosis: Guarded.





Attestation: Documentation prepared by Kori Oneill, acting as medical scribe 

for Black Hernandez MD.

## 2017-09-15 NOTE — PN
Progress Note (short form)





- Note


Progress Note: 


pt seen/ examined


feels much better


denies cp./ sob.


pe ruled out 


drinking fluids


 Vital Signs











Temp  98 F   09/15/17 10:00


 


Pulse  102 H  09/15/17 10:00


 


Resp  18   09/15/17 10:00


 


BP  130/70   09/15/17 10:00


 


Pulse Ox  99   09/15/17 06:00








 Intake & Output











 09/14/17 09/15/17 09/15/17





 23:59 11:59 23:59


 


Intake Total 270 1450 


 


Balance 270 1450 


 


Weight 151 lb 149 lb 6.4 oz 


 


Intake:   


 


  IV 70 840 


 


    1/2 Normal Saline 1,000 70 840 





    ml @ 70 mls/hr IV ASDIR   





    [facility] Rx#:[id number]   


 


  Oral 200 610 


 


Other:   


 


  Voiding Method Toilet Toilet 


 


  # Unmeasured Voids   


 


    Void 1 3 


 


  Bowel Movement No No 


 


  Height 5 ft  


 


  Body Mass Index (BMI) 29.5  


 


  Weight Measurement Method  Standing Scale 








Active Medications





Acetaminophen (Tylenol -)  650 mg PO Q6H PRN


   PRN Reason: FEVER


Atorvastatin Calcium (Lipitor -)  40 mg PO HS [facility]


   Last Admin: 09/14/17 22:15 Dose:  40 mg


Gabapentin (Neurontin -)  100 mg PO DAILY [facility]


   Last Admin: 09/15/17 10:16 Dose:  100 mg


Heparin Sodium (Porcine) (Heparin -)  5,000 unit SQ BID [facility]


   Last Admin: 09/15/17 10:16 Dose:  5,000 unit


Insulin Aspart (Novolog Vial Sliding Scale -)  1 vial SQ TIDAC [facility]


   PRN Reason: Protocol


   Last Admin: 09/15/17 12:10 Dose:  2 unit


Insulin Detemir (Levemir Vial)  35 units SQ [facility]


   Last Admin: 09/14/17 22:18 Dose:  35 units


Metoprolol Tartrate (Lopressor -)  50 mg PO BID [facility]





 CBC, BMP





 09/15/17 06:00 





 09/15/17 06:00 





Physical Examination


Constitutional: Yes: No Distress, Calm


Cardiovascular: Yes: Regular Rate and Rhythm, Other (sternal scar)


Respiratory: Yes: Diminished at bases .


Gastrointestinal: Yes: Normal Bowel Sounds, Soft, Abdomen, Obese.  No: 

Distention, Tenderness


Edema: No


Psychiatric: Yes: Alert,and awake.


 


Imaging





- Results


Chest X-ray: Image Reviewed (no infiltrate or congestion)


Cat Scan: Report Reviewed


EKG: Image Reviewed (sinus tachycardia)





Problem List





- Problems


(1) MOLLY (acute kidney injury)


Code(s): N17.9 - ACUTE KIDNEY FAILURE, UNSPECIFIED





(2) Dehydration


Code(s): E86.0 - DEHYDRATION





(3) Dyspnea


Code(s): R06.00 - DYSPNEA, UNSPECIFIED   Qualifiers: 


     Dyspnea type: unspecified     Qualified Code(s): R06.00 - Dyspnea, 

unspecified  





(4) Elevated d-dimer


Code(s): R79.89 - OTHER SPECIFIED ABNORMAL FINDINGS OF BLOOD CHEMISTRY





(5) Sinus tachycardia


Code(s): R00.0 - TACHYCARDIA, UNSPECIFIED





(6) Coronary artery disease


Code(s): I25.10 - ATHSCL HEART DISEASE OF NATIVE CORONARY ARTERY W/O ANG PCTRS 

  Qualifiers: 


     Coronary Disease-Associated Artery/Lesion type: unspecified vessel or 

lesion type     Native vs. transplanted heart: native heart     Associated 

angina: without angina        Qualified Code(s): I25.10 - Atherosclerotic heart 

disease of native coronary artery without angina pectoris  





(7) Diabetes


Code(s): E11.9 - TYPE 2 DIABETES MELLITUS WITHOUT COMPLICATIONS   Qualifiers: 


     Diabetes mellitus type: other specified (including TRACY)     Diabetes 

mellitus complication status: with skin complications     Diabetes mellitus 

complication detail: with other skin complication     Diabetes mellitus long 

term insulin use: with long term use        Qualified Code(s): E13.628 - Other 

specified diabetes mellitus with other skin complications; Z79.4 - Long term (

current) use of insulin  





(8) HTN (hypertension)


Code(s): I10 - ESSENTIAL (PRIMARY) HYPERTENSION   Qualifiers: 


     Hypertension type: essential hypertension        Qualified Code(s): I10 - 

Essential (primary) hypertension  








Assessment/Plan


clinically better


meds reviewed


continue present care


monitor bgm


physical therapy.


monitor heart rate


echo noted


discussed with Dr. Hernandez also today


Time spent-- 30 min-- includes documentation, examination assessment and plan 

and discussion with nursing staff/ cardiologist.

## 2017-09-15 NOTE — EKG
Test Reason : 

Blood Pressure : ***/*** mmHG

Vent. Rate : 077 BPM     Atrial Rate : 077 BPM

   P-R Int : 196 ms          QRS Dur : 082 ms

    QT Int : 402 ms       P-R-T Axes : -01 -14 014 degrees

   QTc Int : 454 ms

 

NORMAL SINUS RHYTHM

SEPTAL INFARCT (CITED ON OR BEFORE 13-SEP-2017)

ABNORMAL ECG

WHEN COMPARED WITH ECG OF 14-SEP-2017 23:29,

QUESTIONABLE CHANGE IN INITIAL [employer] OF ANTERIOR LEADS

Confirmed by LENA GIRALDO MD ([id number]) on 9/15/2017 9:35:29 AM

 

Referred By: DIGNA MEDINA           Confirmed By:LENA GIRALDO MD

## 2017-09-15 NOTE — CONS
DATE OF CONSULTATION:  2017 

 

CARDIOLOGY CONSULTATION 

 

CONSULTATION REQUESTED BY:  Alexander Soria MD/Shannon Bran MD

 

LOCATION:  [facility].

 

CHIEF COMPLAINT:  Chest pains.

 

HISTORY:  A 72-year-old white female with history of diabetes mellitus with

multisystem involvement, coronary artery disease, ischemic dilated congestive

cardiomyopathy, status post coronary artery bypass graftings, history of

hypertension, insulin-dependent diabetes mellitus with peripheral neuropathy, history

of reentrant supraventricular tachycardia, was seen in Dr. Soria's office and was

found to have a rapid heartbeat and was sent to the office.  

 

On questioning, patient says that she had experienced chest pain last  night

which was persistent but did not go to the hospital.  She denied having palpitations,

no history of lightheadedness, dizziness, presyncope, or syncope reported.  No

history of weakness.  No history of chills or fever.  

 

On further questioning, daughter volunteered the information that patient

approximately 2 weeks ago was hospitalized because she was complaining of severe pain

involving the left thigh and went to see her gynecologist, was found to have an

abscess, probably involving the perineum, which extended towards her abdomen.  She

had undergone an incision and drainage and treated with antibiotics.  

 

PAST MEDICAL HISTORY:  As mentioned in the history of present illness.  History of a

ruptured cerebral aneurysm.

 

PAST SURGICAL HISTORY:  

1.  Clipping of cerebral aneurysm.  

2.  Status post coronary artery bypass grafting.

3.  Status post I&D.

SOCIAL HISTORY:  , has healthy children.  Used to be a 3-pack cigarette smoker

starting at the age of 18 and apparently stopped in her late 20s or early 30s. 

Denies alcohol abuse. 

 

FAMILY HISTORY:  Father  at age 48 of carcinoma of the stomach.  Mother  in

her 80s also related to carcinoma of the stomach.  Patient has 2 brothers and a

sister.  One of her older brothers is a diabetic.  

 

ALLERGIES:  None recorded.

 

MEDICATIONS:  Medications prior to admission were as follows:

1.  Carvedilol 12.5 mg p.o. b.i.d. 

2.  Spironolactone 25 mg p.o. daily.

3.  Gabapentin 100 mg p.o. daily.

4.  Mentax 1 p.o. daily.

5.  Lantus SoloStar 40 units subcutaneous at bedtime.  

 

Current medications also include:

1.  Atorvastatin 40 mg p.o. daily.

2.  NovoLog insulin according to sliding scale.

3.  Levemir insulin 35 units subcutaneous at bedtime.  

 

REVIEW OF SYSTEMS:  

Constitutional:  No history of chills, fever, or night sweats reported.  No history

of unintentional weight loss.  

HEENT:  No history of headaches, diplopia, blurred vision.  No history of epistaxis,

hoarseness, tinnitus, or deafness reported.

Cardiovascular:  See history of present illness.

Respiratory:  No history of cough, expectoration, or hemoptysis.

Gastrointestinal:  No history of nausea, vomiting, melena, or hematemesis.  History

of recent abdominal pain apparently related to abscess.  No history of change of

bowel habits reported.

Central Nervous System:  History of a ruptured cerebral aneurysm in  with partial

memory loss.  No history of focal weakness or seizures reported.  

Endocrine:  See history of present illness.  No history of intolerance to cold or

warm weather. 

Musculoskeletal:  No history of myalgias or arthralgias reported.  

Hematologic:  No history of anemia, ecchymosis, or bleeding.

 

PHYSICAL EXAMINATION:  

General:  A 72-year-old female who was in no acute distress, no pallor, cyanosis,

clubbing, or jaundice.  

Vital Signs:  Blood pressure 112/61 mmHg.  Pulse 125 beats per minute.  Temperature

98 degrees Fahrenheit.  Respirations were 16 per minute.  O2 saturation 96%.  

Neck:  Supple.  No jugular venous distention.  Carotids were equal and upstrokes were

normal.  No bruits were heard and no thyromegaly was present.  

Heart:  PMI was in the 5th intercostal space.  No heaves or thrills.  S1 and S2 were

normal.  No murmur or gallops were appreciated. 

Lungs:  Clear on auscultation.

Chest:  Normal AP diameter.  Expansion was symmetrical.  There was a well-healed

midline sternotomy scar.  

Abdomen:  Soft, nontender.  No hepatosplenomegaly or palpable masses were felt. 

Bowel sounds were present.  No bruits were heard.  

Extremities:  No calf tenderness or dependent edema.  Femoral pulses were 2+. 

Dorsalis pedis and posterior tibial pulses were weak.  

 

DIAGNOSTIC DATA:  ECG of 2017, was reported as sinus tachycardia,

intraseptal wall myocardial infarction.  In reviewing the cardiogram there is an

incomplete right bundle branch block and QS pattern in V2-V3 with poor R wave

progression V4, V5, and _____ in V6.  Left axis deviation suggestive of left anterior

doroteo block.  There was QS pattern in III and aVF.  On reviewing the EKGs, there is

suspicion that patient has reentrant supraventricular tachycardia.  

 

Carotid sinus massage revealed underlying sinus rhythm with episodes of reentrant

supraventricular tachycardia.  

 

CBC 2017:  WBC count 5600.  Hemoglobin 11 g.  Platelet count 148,000.  

 

A rightward shift with elevated lymphocytes of 54.4%, and neutrophils were 29.4%. 

Eosinophils were elevated at 5.6%.  Monocytes were 9.8%.  

 

Chemistries:  Sodium 137, potassium 4.7, chloride 106, CO2 of 23 mmol/L.  BUN was 45,

creatinine 1.0 mg/dL.  

 

BUN on admission was 63 and creatinine was 1.1 mg/dL.  

 

CT chest with contrast did not reveal any pulmonary embolus.  She did have tiny

pulmonary nodules that apparently were unchanged from 2016.  

 

Lower extremity venous Doppler revealed no DVT identified involving either leg.  

 

D-dimer done on 2017, was 735.  

 

Echocardiogram interpretation summary:  

1.  There is mild concentric left ventricular hypertrophy.

2.  Left ventricular systolic function is mildly to moderately reduced.  

3.  There is mild to moderate global hypokinesia of the left ventricle. 

4.  The right ventricle is normal in size and function.

5.  The left atrium is mildly dilated.  

6.  There is trace mitral regurgitation.

 

IMPRESSION:  

1.  Recent chest pain syndrome, etiology most likely related to coronary artery

disease, angina pectoris.

2.  Tachyarrhythmia appears to be reentrant supraventricular tachycardia.

3.  Coronary artery disease status post coronary artery bypass grafting.

4.  History of ischemic congestive cardiomyopathy.

5.  Mild to moderate left ventricular systolic dysfunction secondary to number 4 or

maybe tachycardia-induced cardiomyopathy.  

6.  Insulin-dependent diabetes mellitus with multisystem involvement.

7.  Hypertension, currently normotensive.

8.  Hypercholesterolemia.

9.  Recent perineal abscess.

 

RECOMMENDATIONS:  

1.  Discontinue Coreg and switch to Lopressor 25 mg p.o. q.12 hours.

2.  IV Cardizem according to protocol initially to be given 10 mg and to be repeated

if necessary.

3.  Follow up ECG and enzymes.

4.  T3, T4, TSH.  

5.  Further suggestion will depend upon her response to the above mentioned therapy. 

 

6.  Prognosis guarded.

 

Thank you for your referral.

 

 

DIGNA MEDINA M.D.

 

[id number]

DD: 2017 21:24

DT: 09/15/2017 08:05

Job #:  [id number]

## 2017-09-16 VITALS — TEMPERATURE: 98 F | SYSTOLIC BLOOD PRESSURE: 125 MMHG | DIASTOLIC BLOOD PRESSURE: 65 MMHG | HEART RATE: 90 BPM

## 2017-09-16 RX ADMIN — INSULIN ASPART SCH: 100 INJECTION, SOLUTION INTRAVENOUS; SUBCUTANEOUS at 06:02

## 2017-09-16 RX ADMIN — INSULIN ASPART SCH UNIT: 100 INJECTION, SOLUTION INTRAVENOUS; SUBCUTANEOUS at 12:22

## 2017-09-16 RX ADMIN — HEPARIN SODIUM SCH UNIT: 5000 INJECTION, SOLUTION INTRAVENOUS; SUBCUTANEOUS at 10:54

## 2017-09-16 RX ADMIN — GABAPENTIN SCH MG: 100 CAPSULE ORAL at 10:54

## 2017-09-16 RX ADMIN — METOPROLOL TARTRATE SCH MG: 50 TABLET, FILM COATED ORAL at 10:54

## 2017-09-16 NOTE — DS
Physical Examination


Vital Signs: 


 Vital Signs











Temperature  98 F   09/16/17 10:51


 


Pulse Rate  90   09/16/17 10:51


 


Respiratory Rate  20   09/16/17 10:51


 


Blood Pressure  125/65   09/16/17 10:51


 


O2 Sat by Pulse Oximetry (%)  97   09/15/17 21:00











Findings/Remarks: 


pt feels well


no complains


wants to go home


family at bedside


denies cp.


denies sob.


no abd pain.


h rate much better.


 Vital Signs











Temp  98 F   09/16/17 10:51


 


Pulse  90   09/16/17 10:51


 


Resp  20   09/16/17 10:51


 


BP  125/65   09/16/17 10:51


 


Pulse Ox  97   09/15/17 21:00








 Intake & Output











 09/15/17 09/16/17 09/16/17





 23:59 11:59 23:59


 


Intake Total 410  


 


Balance 410  


 


Weight  148 lb 9.6 oz 


 


Intake:   


 


  Oral 410  


 


Other:   


 


  Voiding Method Toilet  








Active Medications





Acetaminophen (Tylenol -)  650 mg PO Q6H PRN


   PRN Reason: FEVER


Atorvastatin Calcium (Lipitor -)  40 mg PO HS [facility]


   Last Admin: 09/15/17 22:31 Dose:  40 mg


Gabapentin (Neurontin -)  100 mg PO DAILY [facility]


   Last Admin: 09/16/17 10:54 Dose:  100 mg


Heparin Sodium (Porcine) (Heparin -)  5,000 unit SQ BID [facility]


   Last Admin: 09/16/17 10:54 Dose:  5,000 unit


Insulin Aspart (Novolog Vial Sliding Scale -)  1 vial SQ TIDAC [facility]


   PRN Reason: Protocol


   Last Admin: 09/16/17 06:02 Dose:  Not Given


Insulin Detemir (Levemir Vial)  35 units SQ [facility]


   Last Admin: 09/15/17 22:31 Dose:  35 units


Metoprolol Tartrate (Lopressor -)  50 mg PO BID [facility]


   Last Admin: 09/16/17 10:54 Dose:  50 mg





 JOSE APPLE





 09/15/17 06:00 





 09/15/17 06:00 











Constitutional: Yes: No Distress, Calm


Eyes: Yes: Conjunctiva Clear


Neck: Yes: Supple


Cardiovascular: Yes: Regular Rate and Rhythm


Respiratory: Yes: CTA Bilaterally


Gastrointestinal: Yes: Soft


Edema: No


Neurological: Yes: Alert


Labs: 


 JOES APPLE





 09/15/17 06:00 





 09/15/17 06:00 











Discharge Summary


Reason For Visit: ACUTE KIDNEY INJURY/SINUS TACHYCARDIA


Current Active Problems





MOLLY (acute kidney injury) (Acute) 


Dehydration (Acute) 


Dyspnea (Acute) 


Elevated d-dimer (Acute) 


Sinus tachycardia (Acute) 








Hospital Course: 


The patient is a 72 year old female, with a significant past medical history of 

h/o triple bypass (3 years ago), HNT, peripheral neuropathy and IDDM, -- 

admitted due to reentrant atrial tachycardia


pt also given fluids for dehydration.


echo also done.


meds adjusted


pt now much better


stable for d/c.


discussed with Dr. Hernandez also


Meds reconcilled.


eprescribed as needed.


pt to f/u in office with me as well as Dr. Hernandez in one week.


discussed with family


d/c time 35 min in examining/ documenting and coordating care.


Discussed with nursing staff also.








- Instructions


Referrals: 


Alexander Soria MD [Primary Care Provider] - 





- Home Medications


Comprehensive Discharge Medication List: 


Ambulatory Orders





Atorvastatin Ca [Lipitor] 40 mg PO HS 07/22/16 


Insulin Aspart [Novolog Flexpen] 20 unit SQ TID 07/22/16 


Ramipril 5 mg PO DAILY 07/22/16 


Acetaminophen [Tylenol .Regular Strength -] 650 mg PO Q6H PRN #0 tablet 08/22/ 17 


Spironolactone [Aldactone -] 25 mg PO DAILY  tablet 08/22/17 


Gabapentin 100 mg PO DAILY 09/13/17 


Insulin Glargine,Hum.rec.anlog [Lantus Solostar PEN -] 40 units SQ HS 09/13/17 


Levomefolate/B6/B12/Algal Oil [Metanx Capsule] 1 each PO DAILY 09/13/17 


Metoprolol Tartrate [Lopressor -] 50 mg PO BID #60 tablet 09/16/17

## 2019-06-30 ENCOUNTER — HOSPITAL ENCOUNTER (INPATIENT)
Dept: HOSPITAL 74 - JER | Age: 74
LOS: 6 days | Discharge: HOME HEALTH | DRG: 292 | End: 2019-07-06
Payer: COMMERCIAL

## 2019-06-30 DIAGNOSIS — E11.319: ICD-10-CM

## 2019-06-30 DIAGNOSIS — Z98.2: ICD-10-CM

## 2019-06-30 DIAGNOSIS — I25.10: ICD-10-CM

## 2019-06-30 DIAGNOSIS — I42.0: ICD-10-CM

## 2019-06-30 DIAGNOSIS — E11.9: ICD-10-CM

## 2019-06-30 DIAGNOSIS — I11.0: Primary | ICD-10-CM

## 2019-06-30 DIAGNOSIS — Z95.1: ICD-10-CM

## 2019-06-30 DIAGNOSIS — N17.9: ICD-10-CM

## 2019-06-30 DIAGNOSIS — E87.70: ICD-10-CM

## 2019-06-30 DIAGNOSIS — I50.23: ICD-10-CM

## 2019-06-30 LAB
ALBUMIN SERPL-MCNC: 2.8 G/DL (ref 3.4–5)
ALP SERPL-CCNC: 79 U/L (ref 45–117)
ALT SERPL-CCNC: 21 U/L (ref 13–61)
ANION GAP SERPL CALC-SCNC: 7 MMOL/L (ref 8–16)
AST SERPL-CCNC: 17 U/L (ref 15–37)
BASOPHILS # BLD: 0.8 % (ref 0–2)
BILIRUB SERPL-MCNC: 0.4 MG/DL (ref 0.2–1)
BNP SERPL-MCNC: 1442.8 PG/ML (ref 5–125)
BUN SERPL-MCNC: 30.9 MG/DL (ref 7–18)
CALCIUM SERPL-MCNC: 7.9 MG/DL (ref 8.5–10.1)
CHLORIDE SERPL-SCNC: 111 MMOL/L (ref 98–107)
CO2 SERPL-SCNC: 26 MMOL/L (ref 21–32)
CREAT SERPL-MCNC: 0.8 MG/DL (ref 0.55–1.3)
DEPRECATED RDW RBC AUTO: 14 % (ref 11.6–15.6)
EOSINOPHIL # BLD: 3.7 % (ref 0–4.5)
GLUCOSE SERPL-MCNC: 149 MG/DL (ref 74–106)
HCT VFR BLD CALC: 31 % (ref 32.4–45.2)
HGB BLD-MCNC: 10.4 GM/DL (ref 10.7–15.3)
INR BLD: 1.06 (ref 0.83–1.09)
LYMPHOCYTES # BLD: 36 % (ref 8–40)
MAGNESIUM SERPL-MCNC: 2.1 MG/DL (ref 1.8–2.4)
MCH RBC QN AUTO: 30.3 PG (ref 25.7–33.7)
MCHC RBC AUTO-ENTMCNC: 33.5 G/DL (ref 32–36)
MCV RBC: 90.3 FL (ref 80–96)
MONOCYTES # BLD AUTO: 9 % (ref 3.8–10.2)
NEUTROPHILS # BLD: 50.5 % (ref 42.8–82.8)
PLATELET # BLD AUTO: 170 K/MM3 (ref 134–434)
PMV BLD: 7.8 FL (ref 7.5–11.1)
POTASSIUM SERPLBLD-SCNC: 3.7 MMOL/L (ref 3.5–5.1)
PROT SERPL-MCNC: 5.8 G/DL (ref 6.4–8.2)
PT PNL PPP: 12.5 SEC (ref 9.7–13)
RBC # BLD AUTO: 3.43 M/MM3 (ref 3.6–5.2)
SODIUM SERPL-SCNC: 144 MMOL/L (ref 136–145)
WBC # BLD AUTO: 6.1 K/MM3 (ref 4–10)

## 2019-06-30 PROCEDURE — A9502 TC99M TETROFOSMIN: HCPCS

## 2019-06-30 RX ADMIN — INSULIN ASPART SCH UNITS: 100 INJECTION, SOLUTION INTRAVENOUS; SUBCUTANEOUS at 15:50

## 2019-06-30 RX ADMIN — ATORVASTATIN CALCIUM SCH MG: 40 TABLET, FILM COATED ORAL at 21:05

## 2019-06-30 RX ADMIN — INSULIN ASPART SCH UNITS: 100 INJECTION, SOLUTION INTRAVENOUS; SUBCUTANEOUS at 21:39

## 2019-06-30 RX ADMIN — INSULIN ASPART SCH: 100 INJECTION, SOLUTION INTRAVENOUS; SUBCUTANEOUS at 11:28

## 2019-06-30 RX ADMIN — OXYCODONE HYDROCHLORIDE AND ACETAMINOPHEN SCH MG: 500 TABLET ORAL at 10:32

## 2019-06-30 RX ADMIN — FOLIC ACID SCH MG: 1 TABLET ORAL at 10:32

## 2019-06-30 RX ADMIN — DONEPEZIL HYDROCHLORIDE SCH MG: 5 TABLET, FILM COATED ORAL at 21:40

## 2019-06-30 RX ADMIN — SPIRONOLACTONE SCH MG: 25 TABLET, FILM COATED ORAL at 10:32

## 2019-06-30 RX ADMIN — METOPROLOL TARTRATE SCH MG: 25 TABLET, FILM COATED ORAL at 21:05

## 2019-06-30 RX ADMIN — METOPROLOL TARTRATE SCH MG: 25 TABLET, FILM COATED ORAL at 10:32

## 2019-06-30 RX ADMIN — ASPIRIN SCH MG: 81 TABLET, COATED ORAL at 10:32

## 2019-06-30 RX ADMIN — FUROSEMIDE SCH MG: 10 INJECTION, SOLUTION INTRAVENOUS at 10:32

## 2019-06-30 RX ADMIN — ENOXAPARIN SODIUM SCH MG: 40 INJECTION SUBCUTANEOUS at 21:04

## 2019-06-30 NOTE — HP
CHIEF COMPLAINT:


leg swelling for few day





PCP:DR Lynn Soria





HISTORY OF PRESENT ILLNESS:


73YOF with h/o CHF (on Lasix 20 mg daily), CAD (MI 3 years ago resulting in 3-

vessel CABG), brain aneurysm, left  shunt, HTN, HLD, IDDM with neuropathy; p/

w BLE swelling for the past few days significantly worse today. She and the 

 state that she has been nonadherent to her recommended diet lately, 

eating more salt and sugar than she is supposed to. They note she continues to 

take her Lasix as prescribed. The patient denies any f/c/n/v/d/c, chest pain, 

SOB, palpitations, abdominal pain, or other symptoms.





ER course was notable for:


(1)leg swellings


(2)congestion on cxr


(3)normal cardiac enzymes 





Recent Travel:none   





PAST MEDICAL HISTORY:


73YOF with h/o CHF (on Lasix 20 mg daily), CAD (MI 3 years ago resulting in 3-

vessel CABG), brain aneurysm, left  shunt, HTN, HLD, IDDM with neuropathy; 


PAST SURGICAL HISTORY:


CABG


Social History:


Smoking:none


Alcohol:none


Drugs: none





Family History:


Allergies





No Known Allergies Allergy (Verified 06/30/19 05:02)


 








HOME MEDICATIONS:


 Home Medications











 Medication  Instructions  Recorded


 


Atorvastatin Ca [Lipitor] 40 mg PO HS 07/22/16


 


Ramipril 5 mg PO DAILY 07/22/16


 


Spironolactone [Aldactone -] 25 mg PO DAILY  tablet 08/22/17


 


Ascorbic Acid [Vitamin C -] 500 mg PO DAILY 06/30/19


 


Aspirin Coated [Ecotrin -] 81 mg PO DAILY 06/30/19


 


Calcium Carb, Citrate/Vit D3 1 each PO DAILY 06/30/19





[Calcium + D3 ER Tablet]  


 


Donepezil HCl 5 mg PO DAILY 06/30/19


 


Folic Acid 0.4 mg PO DAILY 06/30/19


 


Furosemide 20 mg PO DAILY 06/30/19


 


Insulin Lispro [Humalog] 72 unit SQ DAILY 06/30/19


 


Metoprolol Tartrate [Lopressor -] 25 mg PO BID 06/30/19








REVIEW OF SYSTEMS


CONSTITUTIONAL: 


Absent:  fever, chills, diaphoresis, generalized weakness, malaise, loss of 

appetite, weight change


HEENT: 


Absent:  rhinorrhea, nasal congestion, throat pain, throat swelling, difficulty 

swallowing, mouth swelling, ear pain, eye pain, visual changes


CARDIOVASCULAR: 


Absent: chest pain, syncope, palpitations, irregular heart rate, lightheadedness

, peripheral edema


RESPIRATORY: 


Absent: cough, , orthopnea, wheezing, stridor, hemoptysis


GASTROINTESTINAL:PRESENT shortness of breath, dyspnea with exertion


Absent: abdominal pain, abdominal distension, nausea, vomiting, diarrhea, 

constipation, melena, hematochezia


GENITOURINARY: 


Absent: dysuria, frequency, urgency, hesitancy, hematuria, flank pain, genital 

pain


MUSCULOSKELETAL: 


Absent: myalgia, arthralgia, joint swelling, back pain, neck pain


SKIN: 


Absent: rash, itching, pallor


HEMATOLOGIC/IMMUNOLOGIC: 


Absent: easy bleeding, easy bruising, lymphadenopathy, frequent infections


ENDOCRINE:


Absent: unexplained weight gain, unexplained weight loss, heat intolerance, 

cold intolerance


NEUROLOGIC: 


Absent: headache, focal weakness or paresthesias, dizziness, unsteady gait, 

seizure, mental status changes, bladder or bowel incontinence


PSYCHIATRIC: 


Absent: anxiety, depression, suicidal or homicidal ideation, hallucinations.








PHYSICAL EXAMINATION


 Vital Signs - 24 hr











  06/30/19 06/30/19 06/30/19





  05:02 06:05 06:39


 


Temperature 97.6 F  


 


Pulse Rate 75  


 


Pulse Rate [  76 64





Right]   


 


Respiratory 22 H 18 18





Rate   


 


Blood Pressure 207/74 H  


 


Blood Pressure  184/75 H 160/61





[Right Arm]   


 


O2 Sat by Pulse 95 95 95





Oximetry (%)   











GENERAL: Awake, alert, and fully oriented, in no acute distress.


HEAD: Normal with no signs of trauma.


EYES: Pupils equal, round and reactive to light, extraocular movements intact, 

sclera anicteric, conjunctiva clear. No lid lag.


EARS, NOSE, THROAT: Ears normal, nares patent, oropharynx clear without 

exudates. Moist mucous membranes.


NECK: Normal range of motion, supple without lymphadenopathy, JVD, or masses.


LUNGS: Breath sounds equal, clear to auscultation bilaterally. No wheezes, and 

no crackles. No accessory muscle use.


HEART: Regular rate and rhythm, normal S1 and S2 without murmur, rub or gallop.


ABDOMEN: Soft, nontender, not distended, normoactive bowel sounds, no guarding, 

no rebound, no masses.  No hepatomegaly or  splenomegaly. 


MUSCULOSKELETAL: Normal range of motion at all joints. No bony deformities or 

tenderness. No CVA tenderness.


UPPER EXTREMITIES: 2+ pulses, warm, well-perfused. No cyanosis. No clubbing. No 

peripheral edema.


LOWER EXTREMITIES: 2+ BILATERAL  edema. 


NEUROLOGICAL:  Cranial nerves II-XII intact. Normal speech. Normal gait.


PSYCHIATRIC: Cooperative. Good eye contact. Appropriate mood and affect.


SKIN: Warm, dry, normal turgor, no rashes or lesions noted, normal capillary 

refill. 


 Laboratory Results - last 24 hr











  06/30/19 06/30/19 06/30/19





  05:20 05:42 05:42


 


WBC  6.1  


 


RBC  3.43 L  


 


Hgb  10.4 L  


 


Hct  31.0 L  


 


MCV  90.3  


 


MCH  30.3  


 


MCHC  33.5  


 


RDW  14.0  


 


Plt Count  170  


 


MPV  7.8  


 


Absolute Neuts (auto)  3.1  


 


Neutrophils %  50.5  D  


 


Lymphocytes %  36.0  D  


 


Monocytes %  9.0  


 


Eosinophils %  3.7  


 


Basophils %  0.8  


 


Nucleated RBC %  0  


 


PT with INR   12.50 


 


INR   1.06 


 


Sodium    144


 


Potassium    3.7


 


Chloride    111 H


 


Carbon Dioxide    26


 


Anion Gap    7 L


 


BUN    30.9 H


 


Creatinine    0.8


 


Est GFR (CKD-EPI)AfAm    84.77


 


Est GFR (CKD-EPI)NonAf    73.14


 


Random Glucose    149 H


 


Calcium    7.9 L


 


Magnesium    2.1


 


Total Bilirubin    0.4


 


AST    17


 


ALT    21


 


Alkaline Phosphatase    79


 


Creatine Kinase    201 H


 


Creatine Kinase Index    1.4


 


CK-MB (CK-2)    3.0


 


Troponin I    < 0.02


 


B-Natriuretic Peptide    1442.8 H


 


Total Protein    5.8 L


 


Albumin    2.8 L











ASSESSMENT/PLAN:


HISTORY OF PRESENT ILLNESS:


73YOF with h/o CHF (on Lasix 20 mg daily), CAD (MI 3 years ago resulting in 3-

vessel CABG), brain aneurysm, left  shunt, HTN, HLD, IDDM with neuropathy; p/

w BLE swelling for the past few days significantly worse today.





1 LEG SWELLING AND CONGESTION ON CXR 


 - Heart failure r/o


  will order echo


cardiac enzymes


start on lasix 40 ivp daily


cardiac consult


leg sono r/o dvt orderd in er and awaiting to be done 


dw son





2 - dm


 start on insulin coverage till we fine out from pmd her exact dose of insulin


 


3= HTN


  ramipril  and meotprolol





4 HIgh lipids 


  restart on statins 


5- cad


   start aspirin , cardiac enzymes, toprol and cardiology eval


6- Dementia h/o 


     restart on aericept

## 2019-06-30 NOTE — PDOC
Attending Attestation





- Resident


Resident Name: JamilaJoellen





- ED Attending Attestation


I have performed the following: I have examined & evaluated the patient, The 

case was reviewed & discussed with the resident, I agree w/resident's findings 

& plan





- HPI


HPI: 





06/30/19 05:27


Pt comes with swollen legs bilat.


SHe has no other complaints.  She went out to eat and ate a lot of salty food.  

Pt doesn't stick to a diet.





- Physicial Exam


PE: 





06/30/19 05:28


Agree with resident exam





- Medical Decision Making





06/30/19 05:56


Pt given SL NTG; also given lasix.  Pt is comfortable in the ER.


06/30/19 05:58


Awaiting labs.





**Heart Score/ECG Review





- ECG Intrepretation


Rhythm: Regular Rhythm





- Axis


Axis: Normal





- P and WI


Prominent R with upright T in V1 (true posterior MI): No





- ST and T


Early Repolarization: No


Non Specific ST-T Wave changes: Yes


Flattened T Waves: Yes


Prolonged Q-T Interval: No





- ECG Impressions


Normal ECG: No


Non-specific ST Elevation: No


Ischemic Changes: Yes

## 2019-06-30 NOTE — PDOC
History of Present Illness





- General


Chief Complaint: Edema


Stated Complaint: BOTH LEGS SWOLLEN


Time Seen by Provider: 06/30/19 05:13


History Source: Patient


Exam Limitations: No Limitations





- History of Present Illness


Initial Comments: 





06/30/19 05:15


73YOF with h/o CHF (on Lasix 20 mg daily), CAD (MI 3 years ago resulting in 3-

vessel CABG), brain aneurysm, left  shunt, HTN, HLD, IDDM with neuropathy; p/

w BLE swelling for the past few days significantly worse today. She and the 

 state that she has been nonadherent to her recommended diet lately, 

eating more salt and sugar than she is supposed to. They note she continues to 

take her Lasix as prescribed. The patient denies any f/c/n/v/d/c, chest pain, 

SOB, palpitations, abdominal pain, or other symptoms.





Past History





- Past Medical History


Allergies/Adverse Reactions: 


 Allergies











Allergy/AdvReac Type Severity Reaction Status Date / Time


 


No Known Allergies Allergy   Verified 06/30/19 05:02











Home Medications: 


Ambulatory Orders





Atorvastatin Ca [Lipitor] 40 mg PO HS 07/22/16 


Ramipril 5 mg PO DAILY 07/22/16 


Spironolactone [Aldactone -] 25 mg PO DAILY  tablet 08/22/17 


Ascorbic Acid [Vitamin C -] 500 mg PO DAILY 06/30/19 


Aspirin Coated [Ecotrin -] 81 mg PO DAILY 06/30/19 


Calcium Carb, Citrate/Vit D3 [Calcium + D3 ER Tablet] 1 each PO DAILY 06/30/19 


Donepezil HCl 5 mg PO DAILY 06/30/19 


Folic Acid 0.4 mg PO DAILY 06/30/19 


Furosemide 20 mg PO DAILY 06/30/19 


Insulin Lispro [Humalog] 72 unit SQ DAILY 06/30/19 


Metoprolol Tartrate [Lopressor -] 25 mg PO BID 06/30/19 








Anemia: No


Asthma: No


Cancer: No


Cardiac Disorders: Yes (CABG X3 '16, [place])


CVA: No


COPD: No


CHF: Yes


Dementia: No


Diabetes: Yes (IDDM)


GI Disorders: No


 Disorders: No


HTN: Yes


Hypercholesterolemia: Yes


Liver Disease: No


Seizures: Yes (brain aneurysm (1992))


Thyroid Disease: No





- Surgical History


Abdominal Surgery: No


Appendectomy: No


Cardiac Surgery: Yes (triple bypass 2016)


Cholecystectomy: No


Lung Surgery: No


Neurologic Surgery: Yes (CLIPPING OF ANEURYSM 1992)


Orthopedic Surgery: No





- Suicide/Smoking/Psychosocial Hx


Smoking Status: No


Smoking History: Never smoked


Have you smoked in the past 12 months: No


Number of Cigarettes Smoked Daily: 60


If you are a former smoker, when did you quit?: 30 YRS AGO


Hx Alcohol Use: No


Drug/Substance Use Hx: No


Substance Use Type: None


Hx Substance Use Treatment: No





**Review of Systems





- Review of Systems


Able to Perform ROS?: Yes


Comments:: 





06/30/19 05:31


GEN: no fever, chills, malaise, generalized weakness, or weight change


HEENT: no ear pain, sore throat, vision change, or eye pain


CV: BLE swelling, no chest pain, palpitations, lightheadedness, or syncope


RESP: no cough, wheezing, or SOB


GI: no abdominal pain, nausea, vomiting, diarrhea, constipation, or white/black/

bloody stool


: no dysuria, hematuria, incontinence, retention, bleeding, or discharge 


MSK: BLE swelling


NEURO: no headache, seizure, vertigo, numbness, tingling, or focal weakness


PSYCH: no substance use, no behavior change


SKIN: no jaundice, no rash


ROS otherwise negative except as noted in HPI





*Physical Exam





- Vital Signs


 Last Vital Signs











Temp Pulse Resp BP Pulse Ox


 


 97.6 F   75   22 H  207/74 H  95 


 


 06/30/19 05:02  06/30/19 05:02  06/30/19 05:02  06/30/19 05:02  06/30/19 05:02














- Physical Exam


Comments: 





06/30/19 05:32


GENERAL: very pleasant elderly female accompanied by her pleasant , well-

appearing, A/Ox4, no distress, answers questions appropriately


HEENT: PERRLA, EOMI, moist mucous membranes


NECK/BACK: no midline ttp, no spinal stepoff or deformity, no hematoma, full ROM

, neck supple


CARDIOVASCULAR: regular rate/rhythm, normal S1S2, no MGR, strong peripheral 

pulses, capillary refill <2 seconds, extremities wwp, 2+ pitting edema BLE


LUNGS/RESPIRATORY: no respiratory distress, CTAB


GI/ABDOMEN: symmetric side-to-side, normoactive BS, soft, no ttp, no midline 

pulsatile masses


: no CVA tenderness


EXTREMITIES: no muscle atrophy, no acute deformity, 2+ pitting edema BLE


SKIN: warm and dry, no pallor, no jaundice, no rash, no bruising, no skin 

breakdown, no cuts, no lesions


NEUROLOGICAL: GCS 15, CN II-XII grossly intact, 5/5 strength proximally and 

distally, no facial droop





ED Treatment Course





- LABORATORY


CBC & Chemistry Diagram: 


 06/30/19 05:20





 06/30/19 05:42





Medical Decision Making





- Medical Decision Making





06/30/19 05:34


73YOF with h/o CHF who p/w BLE edema similar to her prior CHF





 Initial Vital Signs











Temp Pulse Resp BP Pulse Ox


 


 97.6 F   75   22 H  207/74 H  95 


 


 06/30/19 05:02  06/30/19 05:02  06/30/19 05:02  06/30/19 05:02  06/30/19 05:02








Exam: As noted in Physical Exam section.


DDX IBNLT: CHF, pulmonary edema, COPD, asthma, other lung disease, PNA/

bronchitis, anemia, ACS, pericarditis, tamponade, AD, PE, PTX, allergic reaction

, malignancy (e.g. causing pericardial effusion or vascular shunt), other 

infection, pulmonary HTN, etc.


W/U ordered: Labs as noted below, EKG CXR


TX ordered: NTG SL, Lasix IV push





EKG: Reviewed; results as noted in ECG Review section.


CXR: Pulmonary vascular congestion.





 Laboratory Tests











  06/30/19 06/30/19 06/30/19





  05:20 05:42 05:42


 


WBC  6.1  


 


RBC  3.43 L  


 


Hgb  10.4 L  


 


Hct  31.0 L  


 


MCV  90.3  


 


MCH  30.3  


 


MCHC  33.5  


 


RDW  14.0  


 


Plt Count  170  


 


MPV  7.8  


 


Absolute Neuts (auto)  3.1  


 


Neutrophils %  50.5  D  


 


Lymphocytes %  36.0  D  


 


Monocytes %  9.0  


 


Eosinophils %  3.7  


 


Basophils %  0.8  


 


Nucleated RBC %  0  


 


PT with INR   12.50 


 


INR   1.06 


 


Sodium    144


 


Potassium    3.7


 


Chloride    111 H


 


Carbon Dioxide    26


 


Anion Gap    7 L


 


BUN    30.9 H


 


Creatinine    0.8


 


Est GFR (CKD-EPI)AfAm    84.77


 


Est GFR (CKD-EPI)NonAf    73.14


 


Random Glucose    149 H


 


Calcium    7.9 L


 


Magnesium    2.1


 


Total Bilirubin    0.4


 


AST    17


 


ALT    21


 


Alkaline Phosphatase    79


 


Creatine Kinase    201 H


 


Creatine Kinase Index    1.4


 


CK-MB (CK-2)    3.0


 


Troponin I    < 0.02


 


B-Natriuretic Peptide    1442.8 H


 


Total Protein    5.8 L


 


Albumin    2.8 L











06/30/19 07:00


The Pt is unsafe for discharge at this time.


They require further hospital observation, workup, and treatment.


Pt needs IV meds 2/2 likely bowel edema as PO medications likely less effective.


Dr. Duran kindly assumes care of this patient at the end of my shift.





*DC/Admit/Observation/Transfer


Diagnosis at time of Disposition: 


CHF exacerbation


Qualifiers:


 Heart failure type: unspecified Qualified Code(s): I50.9 - Heart failure, 

unspecified








- Discharge Dispostion


Condition at time of disposition: Guarded





- Referrals





- Patient Instructions





- Post Discharge Activity

## 2019-06-30 NOTE — PN
Progress Note (short form)





- Note


Progress Note: 


patient seen in the emergency room


chart is reviewed


Case also discussed with  hospitalist physician


Family at bedside


Comfortable


No distress


Feels little better


 Vital Signs











Temp  97.6 F   06/30/19 05:02


 


Pulse  71   06/30/19 10:31


 


Resp  18   06/30/19 10:31


 


BP  184/74 H  06/30/19 10:31


 


Pulse Ox  96   06/30/19 10:31








 Intake & Output











 06/29/19 06/30/19 06/30/19





 23:59 11:59 23:59


 


Weight  140 lb 


 


Other:   


 


  Height  5 ft 


 


  Body Mass Index (BMI)  27.3 








Active Medications





Ascorbic Acid (Vitamin C -)  500 mg PO DAILY [facility]


   Last Admin: 06/30/19 10:32 Dose:  500 mg


Aspirin (Ecotrin -)  81 mg PO DAILY [facility]


   Last Admin: 06/30/19 10:32 Dose:  81 mg


Atorvastatin Calcium (Lipitor -)  40 mg PO HS [facility]


Donepezil HCl (Aricept -)  5 mg PO HS [facility]


Folic Acid (Folic Acid -)  1 mg PO DAILY [facility]


   Last Admin: 06/30/19 10:32 Dose:  1 mg


Furosemide (Lasix Injection -)  40 mg IVPUSH DAILY [facility]


   Last Admin: 06/30/19 10:32 Dose:  40 mg


Insulin Aspart (Novolog Vial Sliding Scale -)  1 vial SQ ACHS [facility]; Protocol


   Last Admin: 06/30/19 11:28 Dose:  Not Given


Metoprolol Tartrate (Lopressor -)  25 mg PO BID [facility]


   Last Admin: 06/30/19 10:32 Dose:  25 mg


Ramipril (Altace -)  5 mg PO DAILY [facility]


   Last Admin: 06/30/19 10:32 Dose:  5 mg


Spironolactone (Aldactone -)  25 mg PO DAILY [facility]


   Last Admin: 06/30/19 10:32 Dose:  25 mg





 CBC, BMP





 06/30/19 05:20 





 06/30/19 05:42 








Physical Exam


S1 S2 RRR


Lungs decreased At bases


Abd- soft, NT


+2 edema


awake/ comfortable








a/p


CHF exacerbation


IDDM


Coronary artery disease


Anemia


Hypertension


 dementia








Monitor on telemetry


Echocardiogram


Agree with IV Lasix


Monitor lites


Blood pressure elevated


 and adjust blood pressure medications


Will follow


DVT prophylaxis








Problem List





- Problems


(1) CHF exacerbation


Code(s): I50.9 - HEART FAILURE, UNSPECIFIED   


Qualifiers: 


   Heart failure type: unspecified   Qualified Code(s): I50.9 - Heart failure, 

unspecified   





(2) Coronary artery disease


Code(s): I25.10 - ATHSCL HEART DISEASE OF NATIVE CORONARY ARTERY W/O ANG PCTRS 

  


Qualifiers: 


   Coronary Disease-Associated Artery/Lesion type: unspecified vessel or lesion 

type   Native vs. transplanted heart: native heart   Associated angina: without 

angina   Qualified Code(s): I25.10 - Atherosclerotic heart disease of native 

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

## 2019-06-30 NOTE — PDOC
*Physical Exam





- Vital Signs


 Last Vital Signs











Temp Pulse Resp BP Pulse Ox


 


 97.6 F   64   18   160/61   95 


 


 06/30/19 05:02  06/30/19 06:39  06/30/19 06:39  06/30/19 06:39  06/30/19 06:39














ED Treatment Course





- LABORATORY


CBC & Chemistry Diagram: 


 06/30/19 05:20





 06/30/19 05:42





- ADDITIONAL ORDERS


Additional order review: 


 Laboratory  Results











  06/30/19 06/30/19





  05:42 05:42


 


PT with INR   12.50


 


INR   1.06


 


Sodium  144 


 


Potassium  3.7 


 


Chloride  111 H 


 


Carbon Dioxide  26 


 


Anion Gap  7 L 


 


BUN  30.9 H 


 


Creatinine  0.8 


 


Est GFR (CKD-EPI)AfAm  84.77 


 


Est GFR (CKD-EPI)NonAf  73.14 


 


Random Glucose  149 H 


 


Calcium  7.9 L 


 


Magnesium  2.1 


 


Total Bilirubin  0.4 


 


AST  17 


 


ALT  21 


 


Alkaline Phosphatase  79 


 


Creatine Kinase  201 H 


 


Creatine Kinase Index  1.4 


 


CK-MB (CK-2)  3.0 


 


Troponin I  < 0.02 


 


B-Natriuretic Peptide  1442.8 H 


 


Total Protein  5.8 L 


 


Albumin  2.8 L 








 











  06/30/19





  05:20


 


RBC  3.43 L


 


MCV  90.3


 


MCHC  33.5


 


RDW  14.0


 


MPV  7.8


 


Neutrophils %  50.5  D


 


Lymphocytes %  36.0  D


 


Monocytes %  9.0


 


Eosinophils %  3.7


 


Basophils %  0.8














- Medications


Given in the ED: 


ED Medications














Discontinued Medications














Generic Name Dose Route Start Last Admin





  Trade Name Freq  PRN Reason Stop Dose Admin


 


Furosemide  40 mg  06/30/19 05:26  06/30/19 05:40





  Lasix Injection -  IVPUSH  06/30/19 05:27  40 mg





  ONCE ONE   Administration





     





     





     





     


 


Nitroglycerin  0.4 mg  06/30/19 05:28  06/30/19 05:40





  Nitrostat -  SL  06/30/19 05:29  0.4 mg





  ONCE ONE   Administration





     





     





     





     














Medical Decision Making





- Medical Decision Making





06/30/19 07:01


Signout received from Dr. Marino (Resident) and Dr. Kinney (Attending)





74 y/o female with CHF, CAD (s/p CABG), HTN, HLD c/o increased B/L LE edema.  H/

o salt load.  Adherence to Lasix QD. 


CXR shows cardiomegaly and congestion


Duplex, Repeat Troponin pending


Will page hospitalist for admission





06/30/19 07:28


Case d/w Dr. Espinosa, will admit inpatient Telemetry





06/30/19 07:34


Patient reassessed @ bedside


Ambulatory, amenable to admission.





Clinical Impression: Fluid overload superimposed on chronic CHF 2/2 to salt 

loading





*DC/Admit/Observation/Transfer


Diagnosis at time of Disposition: 


CHF exacerbation


Qualifiers:


 Heart failure type: unspecified Qualified Code(s): I50.9 - Heart failure, 

unspecified








- Discharge Dispostion


Condition at time of disposition: Guarded





- Referrals





- Patient Instructions





- Post Discharge Activity

## 2019-07-01 LAB
ALBUMIN SERPL-MCNC: 2.7 G/DL (ref 3.4–5)
ALP SERPL-CCNC: 59 U/L (ref 45–117)
ALT SERPL-CCNC: 20 U/L (ref 13–61)
ANION GAP SERPL CALC-SCNC: 6 MMOL/L (ref 8–16)
APPEARANCE UR: CLEAR
AST SERPL-CCNC: 20 U/L (ref 15–37)
BACTERIA # UR AUTO: 6.1 /HPF
BASOPHILS # BLD: 0.8 % (ref 0–2)
BILIRUB SERPL-MCNC: 0.6 MG/DL (ref 0.2–1)
BILIRUB UR STRIP.AUTO-MCNC: NEGATIVE MG/DL
BUN SERPL-MCNC: 28.2 MG/DL (ref 7–18)
CALCIUM SERPL-MCNC: 7.9 MG/DL (ref 8.5–10.1)
CASTS URNS QL MICRO: 3 /LPF (ref 0–8)
CHLORIDE SERPL-SCNC: 108 MMOL/L (ref 98–107)
CHOLEST SERPL-MCNC: 142 MG/DL (ref 50–200)
CO2 SERPL-SCNC: 30 MMOL/L (ref 21–32)
COLOR UR: YELLOW
CREAT SERPL-MCNC: 0.8 MG/DL (ref 0.55–1.3)
DEPRECATED RDW RBC AUTO: 14.3 % (ref 11.6–15.6)
EOSINOPHIL # BLD: 4.1 % (ref 0–4.5)
EPITH CASTS URNS QL MICRO: 0.8 /HPF
GLUCOSE SERPL-MCNC: 156 MG/DL (ref 74–106)
HCT VFR BLD CALC: 31.3 % (ref 32.4–45.2)
HDLC SERPL-MCNC: 51 MG/DL (ref 40–60)
HGB BLD-MCNC: 10.5 GM/DL (ref 10.7–15.3)
KETONES UR QL STRIP: NEGATIVE
LEUKOCYTE ESTERASE UR QL STRIP.AUTO: (no result)
LYMPHOCYTES # BLD: 41.2 % (ref 8–40)
MCH RBC QN AUTO: 29.9 PG (ref 25.7–33.7)
MCHC RBC AUTO-ENTMCNC: 33.4 G/DL (ref 32–36)
MCV RBC: 89.5 FL (ref 80–96)
MONOCYTES # BLD AUTO: 10.2 % (ref 3.8–10.2)
NEUTROPHILS # BLD: 43.7 % (ref 42.8–82.8)
NITRITE UR QL STRIP: NEGATIVE
PH UR: 7 [PH] (ref 5–8)
PLATELET # BLD AUTO: 163 K/MM3 (ref 134–434)
PMV BLD: 8 FL (ref 7.5–11.1)
POTASSIUM SERPLBLD-SCNC: 4.1 MMOL/L (ref 3.5–5.1)
PROT SERPL-MCNC: 5.5 G/DL (ref 6.4–8.2)
PROT UR QL STRIP: (no result)
PROT UR QL STRIP: (no result)
RBC # BLD AUTO: 3 /HPF (ref 0–4)
RBC # BLD AUTO: 3.5 M/MM3 (ref 3.6–5.2)
SODIUM SERPL-SCNC: 144 MMOL/L (ref 136–145)
SP GR UR: 1.01 (ref 1.01–1.03)
TRIGL SERPL-MCNC: 161 MG/DL (ref 0–150)
UROBILINOGEN UR STRIP-MCNC: 0.2 MG/DL (ref 0.2–1)
WBC # BLD AUTO: 6.2 K/MM3 (ref 4–10)
WBC # UR AUTO: 4 /HPF (ref 0–5)

## 2019-07-01 RX ADMIN — ASPIRIN SCH MG: 81 TABLET, COATED ORAL at 10:42

## 2019-07-01 RX ADMIN — RAMIPRIL SCH MG: 5 CAPSULE ORAL at 10:46

## 2019-07-01 RX ADMIN — FUROSEMIDE SCH MG: 10 INJECTION, SOLUTION INTRAVENOUS at 10:46

## 2019-07-01 RX ADMIN — DONEPEZIL HYDROCHLORIDE SCH MG: 5 TABLET, FILM COATED ORAL at 21:31

## 2019-07-01 RX ADMIN — METOPROLOL TARTRATE SCH MG: 25 TABLET, FILM COATED ORAL at 21:31

## 2019-07-01 RX ADMIN — METOPROLOL TARTRATE SCH MG: 25 TABLET, FILM COATED ORAL at 10:43

## 2019-07-01 RX ADMIN — INSULIN ASPART SCH UNITS: 100 INJECTION, SOLUTION INTRAVENOUS; SUBCUTANEOUS at 21:29

## 2019-07-01 RX ADMIN — SPIRONOLACTONE SCH MG: 25 TABLET, FILM COATED ORAL at 10:42

## 2019-07-01 RX ADMIN — INSULIN ASPART SCH: 100 INJECTION, SOLUTION INTRAVENOUS; SUBCUTANEOUS at 18:04

## 2019-07-01 RX ADMIN — FOLIC ACID SCH MG: 1 TABLET ORAL at 10:42

## 2019-07-01 RX ADMIN — ATORVASTATIN CALCIUM SCH MG: 40 TABLET, FILM COATED ORAL at 21:31

## 2019-07-01 RX ADMIN — INSULIN DETEMIR SCH UNITS: 100 INJECTION, SOLUTION SUBCUTANEOUS at 21:30

## 2019-07-01 RX ADMIN — INSULIN ASPART SCH UNITS: 100 INJECTION, SOLUTION INTRAVENOUS; SUBCUTANEOUS at 06:01

## 2019-07-01 RX ADMIN — OXYCODONE HYDROCHLORIDE AND ACETAMINOPHEN SCH MG: 500 TABLET ORAL at 10:45

## 2019-07-01 RX ADMIN — ENOXAPARIN SODIUM SCH MG: 40 INJECTION SUBCUTANEOUS at 10:45

## 2019-07-01 RX ADMIN — INSULIN ASPART SCH UNITS: 100 INJECTION, SOLUTION INTRAVENOUS; SUBCUTANEOUS at 12:22

## 2019-07-01 NOTE — CON.CARD
Consult


Consult Specialty:: Cardiology for Dr. Moises Salazar


Referred by:: Alexander Soria MD


Reason for Consultation:: Dyspnea





- History of Present Illness


Chief Complaint: Dyspnea


History of Present Illness: 


73YOF with h/o CHF (on Lasix 20 mg daily), CAD ([place] 3 years ago resulting in 3-

vessel CABG), brain aneurysm, left  shunt, HTN, HLD, IDDM with neuropathy; p/

w BLE swelling for the past few days significantly worse today. She and the 

 state that she has been nonadherent to her recommended diet lately, 

eating more salt and sugar than she is supposed to. They note she continues to 

take her Lasix as prescribed. The patient denies any f/c/n/v/d/c, chest pain, 

SOB, palpitations, abdominal pain, or other symptoms.





ER course was notable for:


(1)leg swellings


(2)congestion on cxr


(3)normal cardiac enzymes 








- Past Medical History


CNS: Yes: Other (aneurysm s/p slipping, Left  shunt)


Cardio/Vascular: Yes: CAD (s/p CABG-2016), CHF, HTN, Hyperlipdemia


Endocrine: Yes: Diabetes Mellitus





- Past Surgical History


Past Surgical History: Yes: Bypass





- Alcohol/Substance Use


Hx Alcohol Use: No





- Smoking History


Smoking history: Never smoked


Have you smoked in the past 12 months: No


Aproximately how many cigarettes per day: 60


If you are a former smoker, when did you quit?: 30 YRS AGO





- Social History


Usual Living Arrangement: With Spouse





Home Medications





- Allergies


Allergies/Adverse Reactions: 


 Allergies











Allergy/AdvReac Type Severity Reaction Status Date / Time


 


No Known Allergies Allergy   Verified 06/30/19 05:02














- Home Medications


Home Medications: 


Ambulatory Orders





Atorvastatin Ca [Lipitor] 40 mg PO HS 07/22/16 


Ramipril 5 mg PO DAILY 07/22/16 


Spironolactone [Aldactone -] 25 mg PO DAILY  tablet 08/22/17 


Ascorbic Acid [Vitamin C -] 500 mg PO DAILY 06/30/19 


Aspirin Coated [Ecotrin -] 81 mg PO DAILY 06/30/19 


Calcium Carb, Citrate/Vit D3 [Calcium + D3 ER Tablet] 1 each PO DAILY 06/30/19 


Donepezil HCl 5 mg PO DAILY 06/30/19 


Folic Acid 0.4 mg PO DAILY 06/30/19 


Furosemide 20 mg PO DAILY 06/30/19 


Insulin Lispro [Humalog] 72 unit SQ DAILY 06/30/19 


Metoprolol Tartrate [Lopressor -] 25 mg PO BID 06/30/19 








Vital Signs: 


 Vital Signs











Temperature  98.3 F   07/01/19 06:00


 


Pulse Rate  59 L  07/01/19 06:00


 


Respiratory Rate  19   07/01/19 06:00


 


Blood Pressure  177/56 H  07/01/19 06:00


 


O2 Sat by Pulse Oximetry (%)  97   06/30/19 21:00














- Other Data


Labs, Other Data: 


 CBC, BMP





 07/01/19 05:15 





 07/01/19 05:15 





 INR, PTT











INR  1.06  (0.83-1.09)   06/30/19  05:42    














Assessment/Plan


09/14/2017 Echo: Mild cLVH mild-mod decreased LV fxn, normal RV size and fxn, 

mil dLAE, tr MR





1. Acute on chronic diastolic/systolic heart failure


2. Coronary artery disease s/p CABG, angina pectoris


3. Chest pain syndrome most likely related to angina pectoris.


4. Ischemic cardiomyopathy.


5. Paroxysmal reentrant supraventricular tachycardia.


6. Insulin dependent diabetes mellitus, poor controlled.


7. Peripheral neuropathy.


8. Anemia,


9. Hypertension.


10. S/p clipping of cerebral arterial aneurysm


11. Dementia





Recommendations: 


1. IV diuresis with monitor diuretic response, renal fxn and electrolytes


2. Increase dose of lopressor to 50 mg BID, continue Lipitor 40 qhs, Altace 5 qd

, Aldactone 25 qd, ASA 81 qd


3. Stricter control of DM.


4. Thank you for consultative opportunity

















Atorvastatin Ca [Lipitor] 40 mg PO HS 07/22/16 


Ramipril 5 mg PO DAILY 07/22/16 


Spironolactone [Aldactone -] 25 mg PO DAILY  tablet 08/22/17 


Ascorbic Acid [Vitamin C -] 500 mg PO DAILY 06/30/19 


Aspirin Coated [Ecotrin -] 81 mg PO DAILY 06/30/19 


Calcium Carb, Citrate/Vit D3 [Calcium + D3 ER Tablet] 1 each PO DAILY 06/30/19 


Donepezil HCl 5 mg PO DAILY 06/30/19 


Folic Acid 0.4 mg PO DAILY 06/30/19 


Furosemide 20 mg PO DAILY 06/30/19 


Insulin Lispro [Humalog] 72 unit SQ DAILY 06/30/19 


Metoprolol Tartrate [Lopressor -] 25 mg PO BID 06/30/19

## 2019-07-01 NOTE — HOSP
Subjective





- Review of Symptoms


Events since last encounter: 





Patient noted with elevated bp 190/68 post Metoprolol 50 mg given 1-2 hours ago

, upon exam patient denies headache, sob/cp, noted with anxious/anxiety at 

bedside Will give xanax 0.25 x1. patient was seen by Dr. olea in AM metprolol 

dose was increased to 50 mg BID, remain on spironolactone 25 mg daily, ramipril 

10 mg daily, pending cardiology follow up. Monitor BP trends. 


General: Yes: Other (anxious )


HEENT: No: Head Aches, Visual Changes, Eye Pain, Ear Pain, Dysphasia, Sinus 

Congestion, Post Nasal Drip, Sore Throat, Other


Pulmonary: No: Dyspnea, Cough, Pleuritic Chest Pain, Other


Cardiovascular: No: Chest Pain, Palpitations, Orthopnea, Paroxysmal Noc. Dyspnea

, Edema, Light Headedness, Other


Gastrointestinal: No: Nausea, NOSYM, Vomiting, Abdominal Pain, Diarrhea, 

Constipation, Melena, Hematochezia, Other


Genitourinary: No: Dysuria, NOSYM, Frequency, Incontinence, Hematuria, Retention

, Other


Musculoskeletal: No: No Symptoms, Back Pain, Crepitus, Decreased ROM, Extremity 

Pain, Joint Pain, Joint Swelling, Muscle Pain, Muscle Cramps, Muscle Weakness, 

Other





Physical Examination


Vital Signs: 


 Vital Signs











Temperature  98.1 F   07/01/19 22:00


 


Pulse Rate  62   07/01/19 22:00


 


Respiratory Rate  15   07/01/19 22:00


 


Blood Pressure  190/68 H  07/01/19 22:00


 


O2 Sat by Pulse Oximetry (%)  98   07/01/19 19:41











Constitutional: Yes: Well Nourished, No Distress, Anxious


Eyes: Yes: WNL, Conjunctiva Clear


HENT: Yes: Atraumatic, Normocephalic


Neck: Yes: Supple, Trachea Midline


Respiratory: Yes: Regular, CTA Bilaterally


Gastrointestinal: Yes: Normal Bowel Sounds, Soft


Musculoskeletal: Yes: WNL


Neurological: Yes: Alert, Oriented


Labs: 


 CBC, BMP





 07/01/19 05:15 





 07/01/19 05:15 











Hospitalist Encounter


Assessment: 





HTN 


- noted anxious/ anxiety, give 0.25 mg ativan 


- continue with metoprolol 


- Continue with Ramipril  


- Continue with  Spironolactone 


- pending cardiololgy follow up 


- monitor vitals trend
breastfeeding exclusively

## 2019-07-01 NOTE — CONS
DATE OF CONSULTATION:  

 

DATE OF DICTATION:  2019

 

CARDIOLOGY CONSULTATION 

 

REQUESTING PHYSICIAN:  Alexander Soria M.D. 

 

LOCATION:  Telemetry unit.  

 

CHIEF COMPLAINT:

1.  Increasing pedal edema.

2.  Acute shortness of breath.  

3.  Nonproductive cough.  

 

HISTORY OF PRESENT ILLNESS:  A 53-year-old female with history of coronary artery

disease, status post myocardial infarction, history of ischemic dilated congestive

cardiomyopathy, history of cardiogenic shock, insulin dependent diabetes mellitus,

complicated by peripheral neuropathy and retinopathy.  History of reactive

supraventricular tachycardia, hypertension, hypertensive cardiovascular disease,

hypercholesterolemia.  

 

Patient had developed increasing pedal edema for the past several weeks according to

her , and on the day of admission she developed acute shortness of breath. 

Patient also was noticed to have nonproductive cough which progressively became

worse. There is no history of chest pain or discomfort either at rest or with

exertion, history of mild exertional dyspnea.  Denies having orthopnea.  No history

of palpitations.

 

PAST HISTORY:  As mentioned in the history of present illness.

 

SURGICAL HISTORY:  Status post clipping with cerebral aneurysm.  

 

Status post CABG.

 

Status post tonsillectomy.  

 

SOCIAL HISTORY:  .  Has a son and daughter who are healthy.  She never smoked.

 Used to have a social drink.

 

FAMILY HISTORY:  Father  at age 50 due to complications of carcinoma of the

stomach.  Mother  at age 81 also due to carcinoma of the stomach.  One sister and

two brothers, one of the brothers is a diabetic, the other two siblings are healthy. 

 

 

ALLERGIES:  None reported.  

 

CURRENT MEDICATIONS:  Are as follows:

1.  Ramipril 10 mg p.o. daily.

2.  Lovenox 40 mg subcutaneous daily. 

3.  Metoprolol tartrate 50 mg p.o. b.i.d.

4.  Atorvastatin 20 mg p.o. daily.

5.  NovoLog insulin via sliding scale.

6.  Levemir insulin 10 units subcutaneous at bedtime.

7.  Furosemide 40 mg IV daily.

8.  Spironolactone 25 mg p.o. daily.

9.  Aspirin 81 mg p.o. daily.

10.  Aricept 5 mg p.o. daily.

11.  Folic acid 1 mg p.o. daily. 

12.  Ascorbic acid 500 mg p.o. daily.

 

REVIEW OF SYSTEMS: 

Constitutional:  No history of chills, fever, or night sweats.  No history of

unintentional weight loss.

HEENT:  No history of headaches, history of blurred vision and decreased visual

acuity.  No history of diplopia.  No history of hoarseness or epistaxis.  No history

of tinnitus or deafness reported.  

Cardiovascular:  See history of present illness.  

Respiratory:  See history of present illness.  No history of hemoptysis or

tuberculosis.  

Gastrointestinal:  Denies having nausea, vomiting, melena, or hematemesis.  No

history of abdominal pain or discomfort.  No history of change in bowel habits.  

Neurological:  History of _____.  No history of seizures or syncope.  No history of

focal weakness reported.  No history of lightheadedness, dizziness.  

Endocrine:  See history of present illness.  History of intolerance to warm or cold

weather.  

Musculoskeletal:  No history of myalgias or arthralgias.  

Genitourinary:  No history of dysuria, frequency, or hematuria.  

Hematological/Lymphatics:  History of anemia, bleeding, or ecchymosis.  No history of

lymph node enlargement.  

 

PHYSICAL EXAMINATION:

General:  A 73-year-old female in no acute distress, no pallor, cyanosis, clubbing,

or jaundice.  

Vital signs:  Blood pressure 169/63 mmHg.  Earlier was 187/71 mmHg.  Pulse was 67

beats per minute.  Temperature was 98.2 degrees Fahrenheit.  Respirations were 18 per

minute.  Weight was 73.164 kg.  

Neck:  Supple.  No jugulovenous distention, positive hepatojugular reflex, carotids

were 2+, upstrokes were normal.  Faint bilateral bruits versus radiation murmur more

pronounced on the right.  No thyromegaly was present.  No supraclavicular or

submandibular lymphadenopathy.  Trachea was midline.  

Heart:  PMI was in the 5th intercostal space, no heaves or thrills, S1 and S2 were

normal.  Grade 1/6 decrescendo systolic murmur was heard at the apex and along the

left sternal border.  No diastolic murmur or gallops were heard.

Lungs:  Scattered crepitations at the left base.  Lung fields were otherwise clear.  

Chest:  Normal AP diameter.  Expansion was symmetrical.  There was a well healed

midline sternotomy scar.  

Abdomen:  Soft, protuberant, nontender, no hepatosplenomegaly or palpable masses were

felt.  The bowel sounds were heard.

Extremities:  2+ bilateral pretibial edema.  No calf tenderness elicited.  Femoral

pulses were 2+, dorsalis pedis pulses were 1+, posterior tibial pulses were weak.  

 

LABORATORY DATA:  X-ray chest on 2019:  Single AP view of the chest is being

submitted since _____ there is slightly weaker inspiration with slight rotation to

the left, internal sutures, and other hardware within the clip, left  shunt

catheter, congestive changes with some questionable atelectasis at the bases.  There

is a prominent mediastinum.  Correlation recommended.  

 

Echocardiogram, interpretation summary:

1.  Left ventricle is normal in size.

2.  Left ventricular systolic function is normal. 

3.  No regional wall motion abnormality is noted.  

4.  Ejection fraction is 60% to 65%.

5.  Right ventricular systolic function is normal.  

6.  Left atrium is mildly dilated.  

7.  Right atrial size is normal.  

8.  There is mild mitral regurgitation.

9.  Trace to mild pulmonic valvular regurgitation.  There is no pericardial effusion.

 

 

ECG dated 2019:  Sinus rhythm, anteroseptal wall myocardial infarction of

indeterminate age, low voltage in limb leads, incomplete right bundle branch block,

nonspecific ST and T abnormalities, no previous ECG is available for comparison.  

 

LABORATORY DATA:  CBC:  WBC 6200, hemoglobin 10.5 g/dL, hematocrit 31.3%, platelet

count 163,000.  Chemistry:  Sodium 144, potassium 4.1, chloride 108, CO2 of 30

mmol/L, BUN 28.2, creatinine 0.8 mg/dL, random glucose 156 mg/dL, hemoglobin A1c

9.9%, calcium 7.9 mg/dL.  BNP is not available.  Total cholesterol 142, HDL 51,

triglycerides 161, LDL 69 mg/dL.  TSH 1.78.  

 

IMPRESSION:

1.  Clinical presentation consistent with congestive heart failure, New York Heart

classification 4. 

2.  Coronary artery disease, status post coronary artery bypass grafting.

3.  Status post cardiogenic shock.

4.  Insulin dependent diabetes mellitus, poorly controlled.

5.  Diabetic neuropathy.  

6.  History of diabetic retinopathy.  

7.  Hypertension, poorly controlled.  

8.  Hypercholesterolemia.  

9.  Poor compliance.  

10.  Status post clipping of rupture.

11.  Suspect left ventricular diastolic dysfunction.  

 

RECOMMENDATION:

1.  Concur with current line of treatment. 

2.  BNP.

3.  Troponin levels.

4.  Follow up ECG.

5.  Dietary compliance is of paramount _____ aspiration has been eating foods rich in

salt, sugar, and fat.  

6.  Follow up x-ray chest, possible PA and lateral. 

7.  Prognosis guarded.  

 

Thank you for your referral.  

 

 

DIGNA MEDINA M.D.

 

TIANA/[id number]

DD: 2019 20:18

DT: 2019 21:00

Job #:  [id number]

## 2019-07-01 NOTE — ECHO
______________________________________________________________________________



Name: SERJIO EWING                                    Exam:Adult Echocardiogram

MRN: [id number]         Study Date: 2019 12:37 PM

Age: 73 yrs

______________________________________________________________________________



Reason For Study: acute cva

Height: 60 in        Weight: 140 lb        BSA: 1.6 m2



______________________________________________________________________________



MMode/2D Measurements & Calculations

IVSd: 1.1 cm                                 Ao root diam: 3.4 cm

LVIDd: 4.3 cm                                LA dimension: 4.1 cm

LVIDs: 2.9 cm                                ACS: 1.7 cm

LVPWd: 0.93 cm

IVSs: 1.5 cm



______________________________________________

LVPWs: 1.4 cm                                EDV(Teich): 83.3 ml

                                             ESV(Teich): 33.4 ml



Doppler Measurements & Calculations

MV E max buddy: 80.0 cm/sec                                Ao V2 max: 114.7 cm/sec

MV A max buddy: 73.3 cm/sec                                Ao max P.3 mmHg

MV E/A: 1.1                                              Ao V2 mean: 83.3 cm/sec

                                                         Ao mean PG: 3.0 mmHg

                                                         Ao V2 VTI: 30.5 cm



__________________________________________________________

MR max buddy: 358.3 cm/sec                                 PI end-d buddy: 88.9 cm/sec

MR max P.4 mmHg



__________________________________________________________

Med Peak E' Buddy: 3.2 cm/sec

Med E/e': 25.1

Lat Peak E' Buddy: 3.6 cm/sec

Lat E/e': 22.1





______________________________________________________________________________

Procedure

A complete two-dimensional transthoracic echocardiogram was performed (2D, M-mode, Doppler and color 
flow

Doppler).

Left Ventricle

The left ventricle is normal in size. Left ventricular systolic function is normal. Ejection Fraction
 = 60-

65%. No regional wall motion abnormalities noted.

Right Ventricle

The right ventricle is normal size. The right ventricular systolic function is normal.

Atria

The left atrium is mildly dilated. Right atrial size is normal.

Mitral Valve

The mitral valve is normal in structure and function. There is mild mitral regurgitation.

Tricuspid Valve

The tricuspid valve is normal in structure and function. No tricuspid regurgitation.

Aortic Valve

The aortic valve is normal in structure and function. No aortic regurgitation is present.

Pulmonic Valve

The pulmonic valve is not well visualized. Trace to mild pulmonic valvular regurgitation.

Great Vessels

The aortic root is normal size.

Pericardium/Pleura

There is no pericardial effusion.



______________________________________________________________________________



Interpretation Summary

The left ventricle is normal in size.

Left ventricular systolic function is normal.

No regional wall motion abnormalities noted.

Ejection Fraction = 60-65%.

The right ventricular systolic function is normal.

The left atrium is mildly dilated.

Right atrial size is normal.

There is mild mitral regurgitation.

Trace to mild pulmonic valvular regurgitation.

There is no pericardial effusion.



Previous study is not available for comparison





Pk Barrios MD 2019 05:20 PM

## 2019-07-01 NOTE — PN
Progress Note (short form)





- Note


Progress Note: 


pt seen/ examined


sitting in chair


feels better


decreased leg edema


wt increased from 140 to 160 !!! < obviously unreliable-->


Denies cp/ sob


Bp running high 


HBa1c also 9.9 -- 





 Vital Signs











Temp  98.3 F   07/01/19 06:00


 


Pulse  59 L  07/01/19 06:00


 


Resp  19   07/01/19 06:00


 


BP  177/56 H  07/01/19 06:00


 


Pulse Ox  97   06/30/19 21:00








 Intake & Output











 06/30/19 06/30/19 07/01/19





 11:59 23:59 11:59


 


Intake Total  50 


 


Balance  50 


 


Weight 140 lb 5.573 oz 161 lb 4.8 oz


 


Intake:   


 


  Oral  50 


 


Other:   


 


  # Unmeasured Voids   


 


    Void   2


 


  Bowel Movement   No


 


  Height 5 ft 5 ft 


 


  Body Mass Index (BMI) 27.3 0.0 


 


  Weight Measurement Method  [facility]








Active Medications





Ascorbic Acid (Vitamin C -)  500 mg PO DAILY [facility]


   Last Admin: 06/30/19 10:32 Dose:  500 mg


Aspirin (Ecotrin -)  81 mg PO DAILY [facility]


   Last Admin: 06/30/19 10:32 Dose:  81 mg


Atorvastatin Calcium (Lipitor -)  40 mg PO HS [facility]


   Last Admin: 06/30/19 21:05 Dose:  40 mg


Donepezil HCl (Aricept -)  5 mg PO HS [facility]


   Last Admin: 06/30/19 21:40 Dose:  5 mg


Enoxaparin Sodium (Lovenox -)  40 mg SQ DAILY [facility]


   Last Admin: 06/30/19 21:04 Dose:  40 mg


Folic Acid (Folic Acid -)  1 mg PO DAILY [facility]


   Last Admin: 06/30/19 10:32 Dose:  1 mg


Furosemide (Lasix Injection -)  40 mg IVPUSH DAILY [facility]


   Last Admin: 06/30/19 10:32 Dose:  40 mg


Insulin Aspart (Novolog Vial Sliding Scale -)  1 vial SQ [facility]; Protocol


   Last Admin: 07/01/19 06:01 Dose:  2 units


Insulin Detemir (Levemir Vial)  10 units SQ HS [facility]


Metoprolol Tartrate (Lopressor -)  50 mg PO BID [facility]


Ramipril (Altace -)  10 mg PO DAILY [facility]


Spironolactone (Aldactone -)  25 mg PO DAILY [facility]


   Last Admin: 06/30/19 10:32 Dose:  25 mg





 CBC, BMP





 07/01/19 05:15 





 07/01/19 05:15 





 Hepatic Panel











Total Bilirubin  0.6 mg/dL (0.2-1)   07/01/19  05:15    


 


AST  20 U/L (15-37)   07/01/19  05:15    


 


ALT  20 U/L (13-61)   07/01/19  05:15    


 


Alkaline Phosphatase  59 U/L ()   07/01/19  05:15    


 


Albumin  2.7 g/dl (3.4-5.0)  L  07/01/19  05:15    








 Abnormal Lab Results











  07/01/19 07/01/19 07/01/19





  05:15 05:15 05:50


 


RBC  3.50 L  


 


Hgb  10.5 L  


 


Hct  31.3 L  


 


Lymphocytes %  41.2 H  


 


Chloride   108 H 


 


Anion Gap   6 L 


 


BUN   28.2 H 


 


Random Glucose   156 H 


 


Hemoglobin A1c %    9.9 H


 


Calcium   7.9 L 


 


Total Protein   5.5 L 


 


Albumin   2.7 L 


 


Triglycerides   161 H 








Physical Exam





S1 S2 RRR


Lungs decreased At bases


Abd- soft, NT


+1 edema-- Edema


awake/ comfortable








a/p


CHF exacerbation


IDDM


Coronary artery disease


Anemia


Hypertension-- uncontrolled


 dementia








Monitor on telemetry


Echocardiogram--


 IV Lasix


Monitor lites


Blood pressure elevated-- increase Metoprolol


add basal insulin 


cardiology to follow





Will follow











Problem List





- Problems


(1) CHF exacerbation


Code(s): I50.9 - HEART FAILURE, UNSPECIFIED   


Qualifiers: 


   Heart failure type: unspecified   Qualified Code(s): I50.9 - Heart failure, 

unspecified   





(2) Coronary artery disease


Code(s): I25.10 - ATHSCL HEART DISEASE OF NATIVE CORONARY ARTERY W/O ANG PCTRS 

  


Qualifiers: 


   Coronary Disease-Associated Artery/Lesion type: unspecified vessel or lesion 

type   Native vs. transplanted heart: native heart   Associated angina: without 

angina   Qualified Code(s): I25.10 - Atherosclerotic heart disease of native 

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

## 2019-07-02 RX ADMIN — ASPIRIN SCH MG: 81 TABLET, COATED ORAL at 11:43

## 2019-07-02 RX ADMIN — INSULIN DETEMIR SCH UNITS: 100 INJECTION, SOLUTION SUBCUTANEOUS at 21:43

## 2019-07-02 RX ADMIN — OXYCODONE HYDROCHLORIDE AND ACETAMINOPHEN SCH MG: 500 TABLET ORAL at 11:44

## 2019-07-02 RX ADMIN — DONEPEZIL HYDROCHLORIDE SCH MG: 5 TABLET, FILM COATED ORAL at 21:40

## 2019-07-02 RX ADMIN — FUROSEMIDE SCH MG: 10 INJECTION, SOLUTION INTRAVENOUS at 11:41

## 2019-07-02 RX ADMIN — FOLIC ACID SCH MG: 1 TABLET ORAL at 11:41

## 2019-07-02 RX ADMIN — INSULIN ASPART SCH UNITS: 100 INJECTION, SOLUTION INTRAVENOUS; SUBCUTANEOUS at 21:40

## 2019-07-02 RX ADMIN — INSULIN ASPART SCH: 100 INJECTION, SOLUTION INTRAVENOUS; SUBCUTANEOUS at 06:13

## 2019-07-02 RX ADMIN — ENOXAPARIN SODIUM SCH MG: 40 INJECTION SUBCUTANEOUS at 11:44

## 2019-07-02 RX ADMIN — INSULIN ASPART SCH UNITS: 100 INJECTION, SOLUTION INTRAVENOUS; SUBCUTANEOUS at 12:30

## 2019-07-02 RX ADMIN — ATORVASTATIN CALCIUM SCH MG: 40 TABLET, FILM COATED ORAL at 21:40

## 2019-07-02 RX ADMIN — RAMIPRIL SCH MG: 5 CAPSULE ORAL at 11:41

## 2019-07-02 RX ADMIN — METOPROLOL TARTRATE SCH MG: 25 TABLET, FILM COATED ORAL at 21:40

## 2019-07-02 RX ADMIN — INSULIN ASPART SCH UNITS: 100 INJECTION, SOLUTION INTRAVENOUS; SUBCUTANEOUS at 17:55

## 2019-07-02 RX ADMIN — METOPROLOL TARTRATE SCH MG: 25 TABLET, FILM COATED ORAL at 13:00

## 2019-07-02 RX ADMIN — SPIRONOLACTONE SCH MG: 25 TABLET, FILM COATED ORAL at 11:43

## 2019-07-02 NOTE — PN
Progress Note (short form)





- Note


Progress Note: 





73-year-old white female with history of coronary artery disease, dilated 

ischemic cardiomyopathy,status post CABG, insulin-dependent diabetes mellitus 

with peripheral neuropathy and retinopathy,status post ruptured cerebral 

aneurysm treated with clipping and has residual mental deficit. Admitted with 

progressive pedal edema and acute left ventricular failure related to poor 

dietary compliance.  





No history of chest pain or discomfort,no history of paroxysmal nocturnal 

dyspnea or orthopnea.  Patient denies having palpitations, lightheadedness, 

dizziness presyncope or syncope.





Medications:


1.  Ramipril 10 mg by mouth daily.


2.  Lopressor 50 mg by mouth twice a day.


3.  Lovenox 40 mg subcutaneous daily.


4.  Lipitor 40 mg by mouth daily.


5.  NovoLog insulin via sliding scale.


6.  Levemir insulin 10 units subcu at bedtime.


7.  Lasix 40 mg IV daily.


8.  Aldactone 25 mg by mouth daily.


9.  Aspirin 81 mg by mouth daily.


10.  Aricept 5 mg by mouth daily.


11.  Vitamin C 500 mg by mouth daily.


12.  Folic acid 1 mg by mouth daily.





O:73-year-old female was in no acute distress, no pallor, cyanosis, clubbing or 

jaundice.





Vital signs: Blood pressure 183/61 mmHg.  Pulse 53 bpm and regular.  

Temperature 98.4F.  Respirations 16/min.  Weight 69.037 kg.  Oxygen saturation 

98% on room air.


Neck: Supple, no jugular venous distention, hepatojugular reflux was negative, 

carotids were 2+, upstrokes were normal, no bruits were heard, no thyromegaly.


Heart: No heaves or thrills, S1 and S2 were normal, no murmur or gallops were 

heard.


Lungs: Clear on auscultation.


Abdomen: Soft, protuberant, nontender.  No hepatosplenomegaly or palpable 

masses were felt.


Extremities: No calf tenderness, trace to 1+ pretibial edema.





Lab data: Latest lab data is not available.





Impression:


1.  Congestive heart failure, resolving, etiology:


A).  Secondary to poor dietary compliance.


B). Left ventricular diastolic dysfunction.


C). Recurrence of myocardialischemia needs to be excluded.


2.  Coronary artery disease, status post CABG.


3.  Insulin-dependent diabetes mellitus, poorly controlled.


4.  Diabetic neuropathy.


5.  Diabetic retinopathy.


6.  Status post ruptured cerebral aneurysm with residual sequelae.


7. Poor compliance.





Recommendations:


1.  Dietary restrictions were discussed.


2.  Daily weights.


3.  Consider Lexiscan myocardial perfusion study.


4.  Continue current medications.


5.  Patient may require further adjustment of antihypertensive therapy.





Prognosis: Guarded.

## 2019-07-02 NOTE — PN
Progress Note (short form)





- Note


Progress Note: 





feeling well


would like to go home soon


has SOB on ambulation - but better per pt 


 Vital Signs - 24 hr











  07/01/19 07/01/19 07/01/19





  14:00 16:00 18:00


 


Temperature 98.2 F  98.4 F


 


Pulse Rate 83 67 68


 


Respiratory 18 18 16





Rate   


 


Blood Pressure 156/61 169/63 172/58 H


 


O2 Sat by Pulse   





Oximetry (%)   














  07/01/19 07/01/19 07/02/19





  19:41 22:00 02:00


 


Temperature  98.1 F 98.6 F


 


Pulse Rate  62 52 L


 


Respiratory 18 15 14





Rate   


 


Blood Pressure  190/68 H 192/55 H


 


O2 Sat by Pulse 98  





Oximetry (%)   














  07/02/19





  06:00


 


Temperature 98.4 F


 


Pulse Rate 57 L


 


Respiratory 16





Rate 


 


Blood Pressure 157/99


 


O2 Sat by Pulse 





Oximetry (%) 








 Current Medications











Generic Name Dose Route Start Last Admin





  Trade Name Freq  PRN Reason Stop Dose Admin


 


Ascorbic Acid  500 mg  06/30/19 10:00  07/01/19 10:45





  Vitamin C -  PO   500 mg





  DAILY MYKE   Administration





     





     





     





     


 


Aspirin  81 mg  06/30/19 10:00  07/01/19 10:42





  Ecotrin -  PO   81 mg





  DAILY MYKE   Administration





     





     





     





     


 


Atorvastatin Calcium  40 mg  06/30/19 22:00  07/01/19 21:31





  Lipitor -  PO   40 mg





  HS MYKE   Administration





     





     





     





     


 


Donepezil HCl  5 mg  06/30/19 22:00  07/01/19 21:31





  Aricept -  PO   5 mg





  HS MYKE   Administration





     





     





     





     


 


Enoxaparin Sodium  40 mg  06/30/19 20:00  07/01/19 10:45





  Lovenox -  SQ   40 mg





  DAILY MYKE   Administration





     





     





     





     


 


Folic Acid  1 mg  06/30/19 10:00  07/01/19 10:42





  Folic Acid -  PO   1 mg





  DAILY MYKE   Administration





     





     





     





     


 


Furosemide  40 mg  06/30/19 10:00  07/01/19 10:46





  Lasix Injection -  IVPUSH   40 mg





  DAILY MYKE   Administration





     





     





     





     


 


Insulin Aspart  1 vial  06/30/19 11:00  07/02/19 06:13





  Novolog Vial Sliding Scale -  SQ   Not Given





  [facility]   





     





     





  Protocol   





     


 


Insulin Detemir  10 units  07/01/19 22:00  07/01/19 21:30





  Levemir Vial  SQ   10 units





  HS MYKE   Administration





     





     





     





     


 


Metoprolol Tartrate  50 mg  07/01/19 10:00  07/01/19 21:31





  Lopressor -  PO   50 mg





  BID MYKE   Administration





     





     





     





     


 


Ramipril  10 mg  07/01/19 10:00  07/01/19 10:46





  Altace -  PO   10 mg





  DAILY MYKE   Administration





     





     





     





     


 


Spironolactone  25 mg  06/30/19 10:00  07/01/19 10:42





  Aldactone -  PO   25 mg





  DAILY MYKE   Administration





     





     





     





     








 Laboratory Results - last 24 hr











  06/30/19 07/01/19 07/01/19





  12:34 11:32 17:11


 


POC Glucometer   238  194


 


Urine Color  Yellow  


 


Urine Appearance  Clear  


 


Urine pH  7.0  D  


 


Ur Specific Gravity  1.010  


 


Urine Protein  3+ H  


 


Urine Glucose (UA)  1+ H  


 


Urine Ketones  Negative  


 


Urine Blood  Trace  


 


Urine Nitrite  Negative  


 


Urine Bilirubin  Negative  


 


Urine Urobilinogen  0.2  


 


Ur Leukocyte Esterase  Trace  


 


Urine WBC (Auto)  4  


 


Urine RBC (Auto)  3  


 


Urine Casts (Auto)  3  


 


U Epithel Cells (Auto)  0.8  


 


Urine Bacteria (Auto)  6.1  














  07/01/19 07/02/19





  21:04 05:57


 


POC Glucometer  248  126


 


Urine Color  


 


Urine Appearance  


 


Urine pH  


 


Ur Specific Gravity  


 


Urine Protein  


 


Urine Glucose (UA)  


 


Urine Ketones  


 


Urine Blood  


 


Urine Nitrite  


 


Urine Bilirubin  


 


Urine Urobilinogen  


 


Ur Leukocyte Esterase  


 


Urine WBC (Auto)  


 


Urine RBC (Auto)  


 


Urine Casts (Auto)  


 


U Epithel Cells (Auto)  


 


Urine Bacteria (Auto)  











Physical Exam





S1 S2 RRR


Lungs decreased At bases


Abd- soft, NT


+1 edema-- Edema


awake/ comfortable








a/p


CHF exacerbation


IDDM


Coronary artery disease


Anemia


Hypertension-- uncontrolled


 dementia








Monitor on telemetry


Echocardiogram-- normal EF , has diastolic dysfunction 


 IV Lasix


Monitor lytes


BP better


add basal insulin 


cardiology-- spoke with DR Sheikh FAYE





Problem List





- Problems


(1) CHF exacerbation


Code(s): I50.9 - HEART FAILURE, UNSPECIFIED   


Qualifiers: 


   Heart failure type: unspecified   Qualified Code(s): I50.9 - Heart failure, 

unspecified   





(2) MOLLY (acute kidney injury)


Code(s): N17.9 - ACUTE KIDNEY FAILURE, UNSPECIFIED   





(3) Anemia


Code(s): D64.9 - ANEMIA, UNSPECIFIED   





(4) Coronary artery disease


Code(s): I25.10 - ATHSCL HEART DISEASE OF NATIVE CORONARY ARTERY W/O ANG PCTRS 

  


Qualifiers: 


   Coronary Disease-Associated Artery/Lesion type: unspecified vessel or lesion 

type   Native vs. transplanted heart: native heart   Associated angina: without 

angina   Qualified Code(s): I25.10 - Atherosclerotic heart disease of native 

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

## 2019-07-03 LAB
ANION GAP SERPL CALC-SCNC: 6 MMOL/L (ref 8–16)
BUN SERPL-MCNC: 33.7 MG/DL (ref 7–18)
CALCIUM SERPL-MCNC: 8.1 MG/DL (ref 8.5–10.1)
CHLORIDE SERPL-SCNC: 110 MMOL/L (ref 98–107)
CO2 SERPL-SCNC: 27 MMOL/L (ref 21–32)
CREAT SERPL-MCNC: 0.8 MG/DL (ref 0.55–1.3)
GLUCOSE SERPL-MCNC: 156 MG/DL (ref 74–106)
POTASSIUM SERPLBLD-SCNC: 4.3 MMOL/L (ref 3.5–5.1)
SODIUM SERPL-SCNC: 143 MMOL/L (ref 136–145)

## 2019-07-03 RX ADMIN — INSULIN ASPART SCH UNITS: 100 INJECTION, SOLUTION INTRAVENOUS; SUBCUTANEOUS at 16:01

## 2019-07-03 RX ADMIN — HYDRALAZINE HYDROCHLORIDE SCH MG: 25 TABLET, FILM COATED ORAL at 13:36

## 2019-07-03 RX ADMIN — ATORVASTATIN CALCIUM SCH MG: 40 TABLET, FILM COATED ORAL at 21:19

## 2019-07-03 RX ADMIN — ASPIRIN SCH MG: 81 TABLET, COATED ORAL at 10:23

## 2019-07-03 RX ADMIN — RAMIPRIL SCH MG: 5 CAPSULE ORAL at 10:23

## 2019-07-03 RX ADMIN — INSULIN ASPART SCH UNITS: 100 INJECTION, SOLUTION INTRAVENOUS; SUBCUTANEOUS at 21:26

## 2019-07-03 RX ADMIN — INSULIN ASPART SCH: 100 INJECTION, SOLUTION INTRAVENOUS; SUBCUTANEOUS at 07:12

## 2019-07-03 RX ADMIN — SPIRONOLACTONE SCH MG: 25 TABLET, FILM COATED ORAL at 10:23

## 2019-07-03 RX ADMIN — INSULIN DETEMIR SCH UNITS: 100 INJECTION, SOLUTION SUBCUTANEOUS at 21:27

## 2019-07-03 RX ADMIN — METOPROLOL TARTRATE SCH MG: 25 TABLET, FILM COATED ORAL at 21:19

## 2019-07-03 RX ADMIN — METOPROLOL TARTRATE SCH MG: 25 TABLET, FILM COATED ORAL at 10:23

## 2019-07-03 RX ADMIN — OXYCODONE HYDROCHLORIDE AND ACETAMINOPHEN SCH MG: 500 TABLET ORAL at 10:24

## 2019-07-03 RX ADMIN — FUROSEMIDE SCH MG: 10 INJECTION, SOLUTION INTRAVENOUS at 10:23

## 2019-07-03 RX ADMIN — INSULIN ASPART SCH UNITS: 100 INJECTION, SOLUTION INTRAVENOUS; SUBCUTANEOUS at 11:30

## 2019-07-03 RX ADMIN — DONEPEZIL HYDROCHLORIDE SCH MG: 5 TABLET, FILM COATED ORAL at 21:19

## 2019-07-03 RX ADMIN — FOLIC ACID SCH MG: 1 TABLET ORAL at 10:23

## 2019-07-03 RX ADMIN — HYDRALAZINE HYDROCHLORIDE SCH MG: 25 TABLET, FILM COATED ORAL at 21:18

## 2019-07-03 RX ADMIN — ENOXAPARIN SODIUM SCH MG: 40 INJECTION SUBCUTANEOUS at 10:24

## 2019-07-03 NOTE — PN
Progress Note (short form)





- Note


Progress Note: 





feeling well


would like to go home soon


has SOB on ambulation - but better per pt 


 Vital Signs - 24 hr











  07/02/19 07/02/19 07/02/19





  21:00 22:00 23:00


 


Temperature 98 F  


 


Pulse Rate 64  56 L


 


Respiratory 18  18





Rate   


 


Blood Pressure 189/65 H  191/59 H


 


O2 Sat by Pulse  98 





Oximetry (%)   














  07/02/19 07/03/19 07/03/19





  23:30 00:00 01:31


 


Temperature   97.8 F


 


Pulse Rate 65 54 L 56 L


 


Respiratory 18 18 18





Rate   


 


Blood Pressure 191/74 H 182/62 H 162/59 L


 


O2 Sat by Pulse   





Oximetry (%)   














  07/03/19 07/03/19 07/03/19





  06:00 07:51 09:00


 


Temperature 98.1 F  


 


Pulse Rate 57 L 56 L 


 


Respiratory 18 16 





Rate   


 


Blood Pressure 172/59 H 159/52 L 


 


O2 Sat by Pulse   98





Oximetry (%)   














  07/03/19 07/03/19 07/03/19





  11:00 13:42 14:00


 


Temperature   97.8 F


 


Pulse Rate 52 L 58 L 60


 


Respiratory 16 18 21 H





Rate   


 


Blood Pressure 171/66 H 138/94 167/54 L


 


O2 Sat by Pulse   





Oximetry (%)   














  07/03/19 07/03/19





  16:03 17:25


 


Temperature  


 


Pulse Rate 62 60


 


Respiratory 18 18





Rate  


 


Blood Pressure 113/92 171/57 H


 


O2 Sat by Pulse  





Oximetry (%)  








 Current Medications











Generic Name Dose Route Start Last Admin





  Trade Name Freq  PRN Reason Stop Dose Admin


 


Ascorbic Acid  500 mg  06/30/19 10:00  07/03/19 10:24





  Vitamin C -  PO   500 mg





  DAILY MYKE   Administration





     





     





     





     


 


Aspirin  81 mg  06/30/19 10:00  07/03/19 10:23





  Ecotrin -  PO   81 mg





  DAILY MYKE   Administration





     





     





     





     


 


Atorvastatin Calcium  40 mg  06/30/19 22:00  07/02/19 21:40





  Lipitor -  PO   40 mg





  [facility]   Administration





     





     





     





     


 


Donepezil HCl  5 mg  06/30/19 22:00  07/02/19 21:40





  Aricept -  PO   5 mg





  [facility]   Administration





     





     





     





     


 


Enoxaparin Sodium  40 mg  06/30/19 20:00  07/03/19 10:24





  Lovenox -  SQ   40 mg





  DAILY MYKE   Administration





     





     





     





     


 


Folic Acid  1 mg  06/30/19 10:00  07/03/19 10:23





  Folic Acid -  PO   1 mg





  DAILY MYKE   Administration





     





     





     





     


 


Furosemide  40 mg  06/30/19 10:00  07/03/19 10:23





  Lasix Injection -  IVPUSH   40 mg





  DAILY MYKE   Administration





     





     





     





     


 


Hydralazine HCl  25 mg  07/03/19 14:00  07/03/19 13:36





  Apresoline -  PO   25 mg





  TID MY[facility]   Administration





     





     





     





     


 


Insulin Aspart  1 vial  06/30/19 11:00  07/03/19 16:01





  Novolog Vial Sliding Scale -  SQ   2 units





  [facility]   Administration





     





     





  Protocol   





     


 


Insulin Detemir  10 units  07/01/19 22:00  07/02/19 21:43





  Levemir Vial  SQ   10 units





  [facility]   Administration





     





     





     





     


 


Melatonin  5 mg  07/02/19 23:59  07/03/19 00:24





  Melatonin  PO   5 mg





  HS PRN   Administration





  INSOMNIA   





     





     





     


 


Metoprolol Tartrate  50 mg  07/01/19 10:00  07/03/19 10:23





  Lopressor -  PO   50 mg





  BID [facility]   Administration





     





     





     





     


 


Ramipril  10 mg  07/01/19 10:00  07/03/19 10:23





  Altace -  PO   10 mg





  DAILY MYKE   Administration





     





     





     





     


 


Spironolactone  25 mg  06/30/19 10:00  07/03/19 10:23





  Aldactone -  PO   25 mg





  DAILY MYKE   Administration





     





     





     





     








 Laboratory Results - last 24 hr











  07/02/19 07/03/19 07/03/19





  21:31 05:39 05:40


 


Sodium    143


 


Potassium    4.3


 


Chloride    110 H


 


Carbon Dioxide    27


 


Anion Gap    6 L


 


BUN    33.7 H


 


Creatinine    0.8


 


Est GFR (CKD-EPI)AfAm    84.77


 


Est GFR (CKD-EPI)NonAf    73.14


 


POC Glucometer  231  148 


 


Random Glucose    156 H


 


Calcium    8.1 L














  07/03/19 07/03/19





  11:29 15:59


 


Sodium  


 


Potassium  


 


Chloride  


 


Carbon Dioxide  


 


Anion Gap  


 


BUN  


 


Creatinine  


 


Est GFR (CKD-EPI)AfAm  


 


Est GFR (CKD-EPI)NonAf  


 


POC Glucometer  223  169


 


Random Glucose  


 


Calcium  














Physical Exam





S1 S2 RRR


Lungs decreased At bases


Abd- soft, NT


+1 edema-- Edema


awake/ comfortable








a/p


CHF exacerbation


IDDM


Coronary artery disease


Anemia


Hypertension-- uncontrolled


 dementia








Monitor on telemetry


Echocardiogram-- normal EF , has diastolic dysfunction 


 IV Lasix


Monitor lytes


BP uncontrolled-- add Hydralazine


add basal insulin 


cardiology-- spoke with DR Sheikh FAYE





Problem List





- Problems


(1) CHF exacerbation


Code(s): I50.9 - HEART FAILURE, UNSPECIFIED   


Qualifiers: 


   Heart failure type: unspecified   Qualified Code(s): I50.9 - Heart failure, 

unspecified   





(2) MOLLY (acute kidney injury)


Code(s): N17.9 - ACUTE KIDNEY FAILURE, UNSPECIFIED   





(3) Anemia


Code(s): D64.9 - ANEMIA, UNSPECIFIED   





(4) Coronary artery disease


Code(s): I25.10 - ATHSCL HEART DISEASE OF NATIVE CORONARY ARTERY W/O ANG PCTRS 

  


Qualifiers: 


   Coronary Disease-Associated Artery/Lesion type: unspecified vessel or lesion 

type   Native vs. transplanted heart: native heart   Associated angina: without 

angina   Qualified Code(s): I25.10 - Atherosclerotic heart disease of native 

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

## 2019-07-03 NOTE — EKG
Test Reason : 

Blood Pressure : ***/*** mmHG

Vent. Rate : 048 BPM     Atrial Rate : 048 BPM

   P-R Int : 178 ms          QRS Dur : 082 ms

    QT Int : 504 ms       P-R-T Axes : 044 -16 014 degrees

   QTc Int : 450 ms

 

SINUS BRADYCARDIA

SEPTAL INFARCT (CITED ON OR BEFORE 13-SEP-2017)

ABNORMAL ECG

WHEN COMPARED WITH ECG OF 30-JUN-2019 05:56,

NONSPECIFIC T WAVE ABNORMALITY NO LONGER EVIDENT IN ANTEROLATERAL

LEADS

Confirmed by CHITO JENKINS MD ([id number]) on 7/3/2019 12:57:10 PM

 

Referred By: GEORGE EUGENE DR           Confirmed By:CHITO JENKINS MD

## 2019-07-04 RX ADMIN — ASPIRIN SCH MG: 81 TABLET, COATED ORAL at 09:27

## 2019-07-04 RX ADMIN — HYDRALAZINE HYDROCHLORIDE SCH MG: 25 TABLET, FILM COATED ORAL at 06:09

## 2019-07-04 RX ADMIN — INSULIN ASPART SCH UNITS: 100 INJECTION, SOLUTION INTRAVENOUS; SUBCUTANEOUS at 11:55

## 2019-07-04 RX ADMIN — SPIRONOLACTONE SCH MG: 25 TABLET, FILM COATED ORAL at 09:27

## 2019-07-04 RX ADMIN — INSULIN DETEMIR SCH UNITS: 100 INJECTION, SOLUTION SUBCUTANEOUS at 21:30

## 2019-07-04 RX ADMIN — HYDRALAZINE HYDROCHLORIDE SCH MG: 25 TABLET, FILM COATED ORAL at 15:00

## 2019-07-04 RX ADMIN — INSULIN ASPART SCH UNITS: 100 INJECTION, SOLUTION INTRAVENOUS; SUBCUTANEOUS at 21:31

## 2019-07-04 RX ADMIN — FOLIC ACID SCH MG: 1 TABLET ORAL at 09:57

## 2019-07-04 RX ADMIN — RAMIPRIL SCH MG: 5 CAPSULE ORAL at 09:28

## 2019-07-04 RX ADMIN — INSULIN ASPART SCH: 100 INJECTION, SOLUTION INTRAVENOUS; SUBCUTANEOUS at 06:14

## 2019-07-04 RX ADMIN — HYDRALAZINE HYDROCHLORIDE SCH MG: 25 TABLET, FILM COATED ORAL at 21:30

## 2019-07-04 RX ADMIN — METOPROLOL TARTRATE SCH MG: 25 TABLET, FILM COATED ORAL at 21:29

## 2019-07-04 RX ADMIN — METOPROLOL TARTRATE SCH MG: 25 TABLET, FILM COATED ORAL at 09:27

## 2019-07-04 RX ADMIN — ENOXAPARIN SODIUM SCH MG: 40 INJECTION SUBCUTANEOUS at 09:29

## 2019-07-04 RX ADMIN — FUROSEMIDE SCH MG: 10 INJECTION, SOLUTION INTRAVENOUS at 09:29

## 2019-07-04 RX ADMIN — DONEPEZIL HYDROCHLORIDE SCH MG: 5 TABLET, FILM COATED ORAL at 21:30

## 2019-07-04 RX ADMIN — ATORVASTATIN CALCIUM SCH MG: 40 TABLET, FILM COATED ORAL at 21:29

## 2019-07-04 RX ADMIN — INSULIN ASPART SCH UNITS: 100 INJECTION, SOLUTION INTRAVENOUS; SUBCUTANEOUS at 17:33

## 2019-07-04 RX ADMIN — OXYCODONE HYDROCHLORIDE AND ACETAMINOPHEN SCH MG: 500 TABLET ORAL at 09:27

## 2019-07-04 NOTE — PN
Progress Note (short form)





- Note


Progress Note: 





feeling well


would like to go home soon


as per RN, was very tearful yesterday-- wanting to go home


 Vital Signs - 24 hr











  07/03/19 07/03/19 07/03/19





  09:00 11:00 13:42


 


Temperature   


 


Pulse Rate  52 L 58 L


 


Respiratory  16 18





Rate   


 


Blood Pressure  171/66 H 138/94


 


O2 Sat by Pulse 98  





Oximetry (%)   














  07/03/19 07/03/19 07/03/19





  14:00 16:03 17:25


 


Temperature 97.8 F  


 


Pulse Rate 60 62 60


 


Respiratory 21 H 18 18





Rate   


 


Blood Pressure 167/54 L 113/92 171/57 H


 


O2 Sat by Pulse   





Oximetry (%)   














  07/03/19 07/04/19 07/04/19





  21:00 01:00 05:00


 


Temperature 97.9 F 98 F 98.2 F


 


Pulse Rate 53 L 53 L 63


 


Respiratory 18 18 16





Rate   


 


Blood Pressure 161/113 H 179/62 H 187/55 H


 


O2 Sat by Pulse 98  





Oximetry (%)   








 Current Medications











Generic Name Dose Route Start Last Admin





  Trade Name Freq  PRN Reason Stop Dose Admin


 


Ascorbic Acid  500 mg  06/30/19 10:00  07/03/19 10:24





  Vitamin C -  PO   500 mg





  DAILY MYKE   Administration





     





     





     





     


 


Aspirin  81 mg  06/30/19 10:00  07/03/19 10:23





  Ecotrin -  PO   81 mg





  DAILY MYKE   Administration





     





     





     





     


 


Atorvastatin Calcium  40 mg  06/30/19 22:00  07/03/19 21:19





  Lipitor -  PO   40 mg





  [facility]   Administration





     





     





     





     


 


Donepezil HCl  5 mg  06/30/19 22:00  07/03/19 21:19





  Aricept -  PO   5 mg





  [facility]   Administration





     





     





     





     


 


Enoxaparin Sodium  40 mg  06/30/19 20:00  07/03/19 10:24





  Lovenox -  SQ   40 mg





  DAILY MYKE   Administration





     





     





     





     


 


Folic Acid  1 mg  06/30/19 10:00  07/03/19 10:23





  Folic Acid -  PO   1 mg





  DAILY MYKE   Administration





     





     





     





     


 


Furosemide  40 mg  06/30/19 10:00  07/03/19 10:23





  Lasix Injection -  IVPUSH   40 mg





  DAILY MYKE   Administration





     





     





     





     


 


Hydralazine HCl  50 mg  07/04/19 14:00  





  Apresoline -  PO   





  TID MYKE   





     





     





     





     


 


Insulin Aspart  1 vial  06/30/19 11:00  07/04/19 06:14





  Novolog Vial Sliding Scale -  SQ   Not Given





  [facility]   





     





     





  Protocol   





     


 


Insulin Detemir  12 units  07/04/19 08:23  





  Levemir Vial  SQ   





  [facility] MY[facility]   





     





     





     





     


 


Melatonin  5 mg  07/02/19 23:59  07/03/19 00:24





  Melatonin  PO   5 mg





  HS PRN   Administration





  INSOMNIA   





     





     





     


 


Metoprolol Tartrate  50 mg  07/01/19 10:00  07/03/19 21:19





  Lopressor -  PO   50 mg





  BID MYKE   Administration





     





     





     





     


 


Ramipril  10 mg  07/01/19 10:00  07/03/19 10:23





  Altace -  PO   10 mg





  DAILY MYKE   Administration





     





     





     





     


 


Spironolactone  25 mg  06/30/19 10:00  07/03/19 10:23





  Aldactone -  PO   25 mg





  DAILY MYKE   Administration





     





     





     





     








 Laboratory Results - last 24 hr











  07/03/19 07/03/19 07/03/19





  11:29 15:59 21:24


 


POC Glucometer  223  169  199














  07/04/19





  06:13


 


POC Glucometer  147














Physical Exam





S1 S2 RRR


Lungs decreased At bases


Abd- soft, NT


+1 edema-- Edema


awake/ comfortable








a/p


CHF exacerbation


IDDM


Coronary artery disease


Anemia


Hypertension-- uncontrolled


 dementia








Monitor on telemetry


Echocardiogram-- normal EF , has diastolic dysfunction 


 IV Lasix


Monitor lytes


BP uncontrolled-- increase Hydralazine


increase basal insulin 


stress test tomorrow- per cardiology


add Klonopin for anxiety 





OOB





Problem List





- Problems


(1) CHF exacerbation


Code(s): I50.9 - HEART FAILURE, UNSPECIFIED   


Qualifiers: 


   Heart failure type: unspecified   Qualified Code(s): I50.9 - Heart failure, 

unspecified   





(2) MOLLY (acute kidney injury)


Code(s): N17.9 - ACUTE KIDNEY FAILURE, UNSPECIFIED   





(3) Anemia


Code(s): D64.9 - ANEMIA, UNSPECIFIED   





(4) Coronary artery disease


Code(s): I25.10 - ATHSCL HEART DISEASE OF NATIVE CORONARY ARTERY W/O ANG PCTRS 

  


Qualifiers: 


   Coronary Disease-Associated Artery/Lesion type: unspecified vessel or lesion 

type   Native vs. transplanted heart: native heart   Associated angina: without 

angina   Qualified Code(s): I25.10 - Atherosclerotic heart disease of native 

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

## 2019-07-04 NOTE — PN
Progress Note (short form)





- Note


Progress Note: 





73-year-old white female with history of coronary artery disease, dilated 

ischemic cardiomyopathy,status post CABG, insulin-dependent diabetes mellitus 

with peripheral neuropathy and retinopathy,status post ruptured cerebral 

aneurysm treated with clipping and has residual mental deficit. Admitted with 

progressive pedal edema and acute left ventricular failure related to poor 

dietary compliance.  





OOB, no SOB, chest pain or discomfort, tolerating her therapy.





Active Medications





Ascorbic Acid (Vitamin C -)  500 mg PO DAILY [facility]


   Last Admin: 07/04/19 09:27 Dose:  500 mg


Aspirin (Ecotrin -)  81 mg PO DAILY [facility]


   Last Admin: 07/04/19 09:27 Dose:  81 mg


Atorvastatin Calcium (Lipitor -)  40 mg PO HS [facility]


   Last Admin: 07/03/19 21:19 Dose:  40 mg


Clonazepam (Klonopin -)  0.25 mg PO Q12H PRN


   PRN Reason: ANXIETY


Donepezil HCl (Aricept -)  5 mg PO HS [facility]


   Last Admin: 07/03/19 21:19 Dose:  5 mg


Enoxaparin Sodium (Lovenox -)  40 mg SQ DAILY [facility]


   Last Admin: 07/04/19 09:29 Dose:  40 mg


Folic Acid (Folic Acid -)  1 mg PO DAILY [facility]


   Last Admin: 07/04/19 09:57 Dose:  1 mg


Furosemide (Lasix Injection -)  40 mg IVPUSH DAILY [facility]


   Last Admin: 07/04/19 09:29 Dose:  40 mg


Hydralazine HCl (Apresoline -)  50 mg PO TID [facility]


Insulin Aspart (Novolog Vial Sliding Scale -)  1 vial SQ [facility]; Protocol


   Last Admin: 07/04/19 11:55 Dose:  2 units


Insulin Detemir (Levemir Vial)  12 units SQ HS [facility]


Melatonin (Melatonin)  5 mg PO HS PRN


   PRN Reason: INSOMNIA


   Last Admin: 07/03/19 00:24 Dose:  5 mg


Metoprolol Tartrate (Lopressor -)  50 mg PO BID [facility]


   Last Admin: 07/04/19 09:27 Dose:  50 mg


Ramipril (Altace -)  10 mg PO DAILY [facility]


   Last Admin: 07/04/19 09:28 Dose:  10 mg


Spironolactone (Aldactone -)  25 mg PO DAILY [facility]


   Last Admin: 07/04/19 09:27 Dose:  25 mg











O:73-year-old female was in no acute distress, no pallor, cyanosis, clubbing or 

jaundice.





 Last Vital Signs











Temp Pulse Resp BP Pulse Ox


 


 98.3 F   57 L  17   164/49 L  96 


 


 07/04/19 09:00  07/04/19 12:00  07/04/19 12:00  07/04/19 12:00  07/04/19 09:00











Neck: Supple, no jugular venous distention, hepatojugular reflux was negative, 

carotids were 2+, upstrokes were normal, no bruits were heard, no thyromegal


Heart: No heaves or thrills, S1 and S2 were normal, no murmur or gallops were 

heard.


Lungs: Clear on auscultation.


Abdomen: Soft, protuberant, nontender.  No hepatosplenomegaly or palpable 

masses were felt.


Extremities: No calf tenderness, trace to 1+ pretibial edema.





 Lab data:





 CBC, BMP





 07/01/19 05:15 





 07/03/19 05:40 





Impression:





1.  Congestive heart failure, resolving, etiology:


A).  Secondary to poor dietary compliance.


B). Left ventricular diastolic dysfunction.


C). Recurrence of myocardial ischemia needs to be excluded.


2.  Coronary artery disease, status post CABG.


3.  Insulin-dependent diabetes mellitus, poorly controlled.


4.  Diabetic neuropathy.


5.  Diabetic retinopathy.


6.  Status post ruptured cerebral aneurysm with residual sequelae.


7.  Poor compliance.


8.  Anemia, etiology, to be determined.





Recommendations:


1.  Increase ambulation.


2.  Daily weights.


3.  Consider Lexiscan myocardial perfusion study.


4.  Continue current medications.


5.  If BP remains elevated, further adjustment of antihypertensive therapy.


 


Prognosis: Guarded.

## 2019-07-05 LAB
ANION GAP SERPL CALC-SCNC: 6 MMOL/L (ref 8–16)
ANION GAP SERPL CALC-SCNC: 7 MMOL/L (ref 8–16)
BUN SERPL-MCNC: 27.7 MG/DL (ref 7–18)
BUN SERPL-MCNC: 33.1 MG/DL (ref 7–18)
CALCIUM SERPL-MCNC: 8.3 MG/DL (ref 8.5–10.1)
CALCIUM SERPL-MCNC: 8.7 MG/DL (ref 8.5–10.1)
CHLORIDE SERPL-SCNC: 107 MMOL/L (ref 98–107)
CHLORIDE SERPL-SCNC: 109 MMOL/L (ref 98–107)
CO2 SERPL-SCNC: 27 MMOL/L (ref 21–32)
CO2 SERPL-SCNC: 28 MMOL/L (ref 21–32)
CREAT SERPL-MCNC: 0.8 MG/DL (ref 0.55–1.3)
CREAT SERPL-MCNC: 0.9 MG/DL (ref 0.55–1.3)
GLUCOSE SERPL-MCNC: 139 MG/DL (ref 74–106)
GLUCOSE SERPL-MCNC: 289 MG/DL (ref 74–106)
POTASSIUM SERPLBLD-SCNC: 4 MMOL/L (ref 3.5–5.1)
POTASSIUM SERPLBLD-SCNC: 4.5 MMOL/L (ref 3.5–5.1)
SODIUM SERPL-SCNC: 142 MMOL/L (ref 136–145)
SODIUM SERPL-SCNC: 143 MMOL/L (ref 136–145)

## 2019-07-05 RX ADMIN — INSULIN ASPART SCH UNITS: 100 INJECTION, SOLUTION INTRAVENOUS; SUBCUTANEOUS at 22:01

## 2019-07-05 RX ADMIN — INSULIN ASPART SCH: 100 INJECTION, SOLUTION INTRAVENOUS; SUBCUTANEOUS at 13:13

## 2019-07-05 RX ADMIN — ENOXAPARIN SODIUM SCH MG: 40 INJECTION SUBCUTANEOUS at 13:12

## 2019-07-05 RX ADMIN — HYDRALAZINE HYDROCHLORIDE SCH MG: 25 TABLET, FILM COATED ORAL at 21:41

## 2019-07-05 RX ADMIN — ATORVASTATIN CALCIUM SCH MG: 40 TABLET, FILM COATED ORAL at 21:40

## 2019-07-05 RX ADMIN — SPIRONOLACTONE SCH MG: 25 TABLET, FILM COATED ORAL at 13:00

## 2019-07-05 RX ADMIN — FUROSEMIDE SCH MG: 10 INJECTION, SOLUTION INTRAVENOUS at 13:12

## 2019-07-05 RX ADMIN — INSULIN DETEMIR SCH UNITS: 100 INJECTION, SOLUTION SUBCUTANEOUS at 21:46

## 2019-07-05 RX ADMIN — HYDRALAZINE HYDROCHLORIDE SCH MG: 25 TABLET, FILM COATED ORAL at 06:29

## 2019-07-05 RX ADMIN — METOPROLOL TARTRATE SCH MG: 25 TABLET, FILM COATED ORAL at 13:11

## 2019-07-05 RX ADMIN — RANOLAZINE SCH MG: 500 TABLET, FILM COATED, EXTENDED RELEASE ORAL at 21:40

## 2019-07-05 RX ADMIN — ASPIRIN SCH MG: 81 TABLET, COATED ORAL at 13:11

## 2019-07-05 RX ADMIN — INSULIN ASPART SCH: 100 INJECTION, SOLUTION INTRAVENOUS; SUBCUTANEOUS at 06:27

## 2019-07-05 RX ADMIN — OXYCODONE HYDROCHLORIDE AND ACETAMINOPHEN SCH MG: 500 TABLET ORAL at 13:12

## 2019-07-05 RX ADMIN — HYDRALAZINE HYDROCHLORIDE SCH MG: 25 TABLET, FILM COATED ORAL at 15:19

## 2019-07-05 RX ADMIN — METOPROLOL TARTRATE SCH MG: 25 TABLET, FILM COATED ORAL at 21:41

## 2019-07-05 RX ADMIN — INSULIN ASPART SCH UNITS: 100 INJECTION, SOLUTION INTRAVENOUS; SUBCUTANEOUS at 17:21

## 2019-07-05 RX ADMIN — RAMIPRIL SCH MG: 5 CAPSULE ORAL at 13:11

## 2019-07-05 RX ADMIN — DONEPEZIL HYDROCHLORIDE SCH MG: 5 TABLET, FILM COATED ORAL at 21:42

## 2019-07-05 RX ADMIN — FOLIC ACID SCH MG: 1 TABLET ORAL at 13:12

## 2019-07-05 NOTE — PN
Progress Note (short form)





- Note


Progress Note: 





73-year-old white female with history of coronary artery disease, dilated 

ischemic cardiomyopathy,status post CABG, insulin-dependent diabetes mellitus 

with peripheral neuropathy and retinopathy,status post ruptured cerebral 

aneurysm treated with clipping and has residual mental deficit. Admitted with 

progressive pedal edema and acute left ventricular failure related to poor 

dietary compliance.  





OOB, no chest or discomfort, myocardial perfusion scan reveals a reversible 

apical and inferolateral defecr 





Active Medications





Ascorbic Acid (Vitamin C -)  500 mg PO DAILY [facility]


   Last Admin: 07/05/19 13:12 Dose:  500 mg


Aspirin (Ecotrin -)  81 mg PO DAILY [facility]


   Last Admin: 07/05/19 13:11 Dose:  81 mg


Atorvastatin Calcium (Lipitor -)  40 mg PO HS [facility]


   Last Admin: 07/04/19 21:29 Dose:  40 mg


Clonazepam (Klonopin -)  0.25 mg PO Q12H PRN


   PRN Reason: ANXIETY


Donepezil HCl (Aricept -)  5 mg PO HS [facility]


   Last Admin: 07/04/19 21:30 Dose:  5 mg


Enoxaparin Sodium (Lovenox -)  40 mg SQ DAILY [facility]


   Last Admin: 07/05/19 13:12 Dose:  40 mg


Folic Acid (Folic Acid -)  1 mg PO DAILY [facility]


   Last Admin: 07/05/19 13:12 Dose:  1 mg


Furosemide (Lasix Injection -)  40 mg IVPUSH DAILY [facility]


   Last Admin: 07/05/19 13:12 Dose:  40 mg


Hydralazine HCl (Apresoline -)  50 mg PO TID [facility]


   Last Admin: 07/05/19 06:29 Dose:  50 mg


Insulin Aspart (Novolog Vial Sliding Scale -)  1 vial SQ [facility]; Protocol


   Last Admin: 07/05/19 13:13 Dose:  Not Given


Insulin Detemir (Levemir Vial)  12 units SQ HS [facility]


   Last Admin: 07/04/19 21:30 Dose:  12 units


Melatonin (Melatonin)  5 mg PO HS PRN


   PRN Reason: INSOMNIA


   Last Admin: 07/03/19 00:24 Dose:  5 mg


Metoprolol Tartrate (Lopressor -)  50 mg PO BID [facility]


   Last Admin: 07/05/19 13:11 Dose:  50 mg


Ramipril (Altace -)  10 mg PO DAILY [facility]


   Last Admin: 07/05/19 13:11 Dose:  10 mg


Spironolactone (Aldactone -)  25 mg PO DAILY [facility]


   Last Admin: 07/05/19 13:00 Dose:  25 mg





 





O:73-year-old female was in no acute distress, no pallor, cyanosis, clubbing or 

jaundice.


                                                                               

                                        


                                                                               

                                        Last Vital Signs











Temp Pulse Resp BP Pulse Ox


 


 97.8 F   56 L  16   171/60 H  96 


 


 07/05/19 06:13  07/05/19 08:25  07/05/19 08:25  07/05/19 08:25  07/04/19 20:03














Neck: Supple, no jugular venous distention, hepatojugular reflux was negative, 

carotids were 2+, upstrokes were normal, no bruits were heard, no thyromegal


Heart: No heaves or thrills, S1 and S2 were normal, no murmur or gallops were 

heard.


Lungs: Clear on auscultation.


Abdomen: Soft, protuberant, nontender.  No hepatosplenomegaly or palpable 

masses were felt.


Extremities: No calf tenderness, trace to 1+ pretibial edema.





                                                                               

                     





 CBC, BMP





 07/01/19 05:15 





Abnormal Lexiscan Myoview perfusion scan, see report





Impression:





1.  Congestive heart failure, Resolved, etiology:


A).  Secondary to poor dietary compliance.


B). Left ventricular diastolic dysfunction.


C). Recurrence of myocardial ischemia.


2.  Coronary artery disease, status post CABG.


3.  Insulin-dependent diabetes mellitus, poorly controlled.


4.  Diabetic neuropathy.


5.  Diabetic retinopathy.


6.  Status post ruptured cerebral aneurysm with residual sequelae.


7.  Poor compliance.


8.  Anemia, etiology, to be determined.





Recommendations:


1. Add Ranexa 500mg. Q12H.


2. F/u CBC and BMP.


3. Furuher evaluation on OPB.


4. NTG 0.4mg. S/L PRN for chest pain or dysnpea.


5. Strict control of DM.


6. Dietary/ sodium restrictions.





Prognosis: Guarded.

## 2019-07-05 NOTE — PN
Progress Note (short form)





- Note


Progress Note: 





feeling well


for stress test 


Vital Signs - 24 hr











  07/04/19 07/04/19 07/04/19





  16:00 18:00 20:03


 


Temperature  98 F 


 


Pulse Rate 65 58 L 


 


Respiratory 18 18 18





Rate   


 


Blood Pressure 181/59 H 167/55 L 


 


O2 Sat by Pulse   96





Oximetry (%)   














  07/04/19 07/05/19 07/05/19





  23:00 02:44 06:13


 


Temperature 98.2 F 98.2 F 97.8 F


 


Pulse Rate 50 L 54 L 57 L


 


Respiratory 15 14 16





Rate   


 


Blood Pressure 155/85 162/44 L 149/38 L


 


O2 Sat by Pulse   





Oximetry (%)   














  07/05/19





  08:25


 


Temperature 


 


Pulse Rate 56 L


 


Respiratory 16





Rate 


 


Blood Pressure 171/60 H


 


O2 Sat by Pulse 





Oximetry (%) 








 Current Medications











Generic Name Dose Route Start Last Admin





  Trade Name Freq  PRN Reason Stop Dose Admin


 


Ascorbic Acid  500 mg  06/30/19 10:00  07/04/19 09:27





  Vitamin C -  PO   500 mg





  DAILY MYKE   Administration





     





     





     





     


 


Aspirin  81 mg  06/30/19 10:00  07/04/19 09:27





  Ecotrin -  PO   81 mg





  DAILY MYKE   Administration





     





     





     





     


 


Atorvastatin Calcium  40 mg  06/30/19 22:00  07/04/19 21:29





  Lipitor -  PO   40 mg





  [facility] MY[facility]   Administration





     





     





     





     


 


Clonazepam  0.25 mg  07/04/19 08:25  





  Klonopin -  PO   





  Q12H PRN   





  ANXIETY   





     





     





     


 


Donepezil HCl  5 mg  06/30/19 22:00  07/04/19 21:30





  Aricept -  PO   5 mg





  [facility] MY[facility]   Administration





     





     





     





     


 


Enoxaparin Sodium  40 mg  06/30/19 20:00  07/04/19 09:29





  Lovenox -  SQ   40 mg





  DAILY MYKE   Administration





     





     





     





     


 


Folic Acid  1 mg  06/30/19 10:00  07/04/19 09:57





  Folic Acid -  PO   1 mg





  DAILY MYKE   Administration





     





     





     





     


 


Furosemide  40 mg  06/30/19 10:00  07/04/19 09:29





  Lasix Injection -  IVPUSH   40 mg





  DAILY MYKE   Administration





     





     





     





     


 


Hydralazine HCl  50 mg  07/04/19 14:00  07/05/19 06:29





  Apresoline -  PO   50 mg





  TID [facility]   Administration





     





     





     





     


 


Insulin Aspart  1 vial  06/30/19 11:00  07/05/19 06:27





  Novolog Vial Sliding Scale -  SQ   Not Given





  [facility]   





     





     





  Protocol   





     


 


Insulin Detemir  12 units  07/04/19 22:00  07/04/19 21:30





  Levemir Vial  SQ   12 units





  [facility]   Administration





     





     





     





     


 


Melatonin  5 mg  07/02/19 23:59  07/03/19 00:24





  Melatonin  PO   5 mg





  HS PRN   Administration





  INSOMNIA   





     





     





     


 


Metoprolol Tartrate  50 mg  07/01/19 10:00  07/04/19 21:29





  Lopressor -  PO   50 mg





  BID MYKE   Administration





     





     





     





     


 


Ramipril  10 mg  07/01/19 10:00  07/04/19 09:28





  Altace -  PO   10 mg





  DAILY MYKE   Administration





     





     





     





     


 


Spironolactone  25 mg  06/30/19 10:00  07/04/19 09:27





  Aldactone -  PO   25 mg





  DAILY MYKE   Administration





     





     





     





     








 Laboratory Results - last 24 hr











  07/04/19 07/04/19 07/05/19





  17:32 21:17 05:17


 


Sodium    143


 


Potassium    4.0


 


Chloride    109 H


 


Carbon Dioxide    27


 


Anion Gap    6 L


 


BUN    33.1 H


 


Creatinine    0.8


 


Est GFR (CKD-EPI)AfAm    84.77


 


Est GFR (CKD-EPI)NonAf    73.14


 


POC Glucometer  153  166 


 


Random Glucose    139 H


 


Calcium    5.4 L*














  07/05/19





  05:19


 


Sodium 


 


Potassium 


 


Chloride 


 


Carbon Dioxide 


 


Anion Gap 


 


BUN 


 


Creatinine 


 


Est GFR (CKD-EPI)AfAm 


 


Est GFR (CKD-EPI)NonAf 


 


POC Glucometer  125


 


Random Glucose 


 


Calcium 

















Physical Exam





S1 S2 RRR


Lungs decreased At bases


Abd- soft, NT


+1 edema-- Edema


awake/ comfortable








a/p


CHF exacerbation


IDDM


Coronary artery disease


Anemia


Hypertension-- uncontrolled


 dementia








Monitor on telemetry


Echocardiogram-- normal EF , has diastolic dysfunction 


 IV Lasix


Monitor lytes


BP uncontrolled-- increase Hydralazine


increase basal insulin 


stress test today  per cardiology


add Klonopin for anxiety 





OOB





Problem List





- Problems


(1) CHF exacerbation


Code(s): I50.9 - HEART FAILURE, UNSPECIFIED   


Qualifiers: 


   Heart failure type: unspecified   Qualified Code(s): I50.9 - Heart failure, 

unspecified   





(2) MOLLY (acute kidney injury)


Code(s): N17.9 - ACUTE KIDNEY FAILURE, UNSPECIFIED   





(3) Anemia


Code(s): D64.9 - ANEMIA, UNSPECIFIED   





(4) Coronary artery disease


Code(s): I25.10 - ATHSCL HEART DISEASE OF NATIVE CORONARY ARTERY W/O ANG PCTRS 

  


Qualifiers: 


   Coronary Disease-Associated Artery/Lesion type: unspecified vessel or lesion 

type   Native vs. transplanted heart: native heart   Associated angina: without 

angina   Qualified Code(s): I25.10 - Atherosclerotic heart disease of native 

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

## 2019-07-06 VITALS — TEMPERATURE: 98.2 F | HEART RATE: 63 BPM | SYSTOLIC BLOOD PRESSURE: 131 MMHG | DIASTOLIC BLOOD PRESSURE: 45 MMHG

## 2019-07-06 RX ADMIN — ENOXAPARIN SODIUM SCH MG: 40 INJECTION SUBCUTANEOUS at 09:28

## 2019-07-06 RX ADMIN — SPIRONOLACTONE SCH MG: 25 TABLET, FILM COATED ORAL at 09:28

## 2019-07-06 RX ADMIN — FUROSEMIDE SCH MG: 10 INJECTION, SOLUTION INTRAVENOUS at 09:28

## 2019-07-06 RX ADMIN — ASPIRIN SCH MG: 81 TABLET, COATED ORAL at 09:29

## 2019-07-06 RX ADMIN — INSULIN ASPART SCH: 100 INJECTION, SOLUTION INTRAVENOUS; SUBCUTANEOUS at 06:22

## 2019-07-06 RX ADMIN — RANOLAZINE SCH MG: 500 TABLET, FILM COATED, EXTENDED RELEASE ORAL at 09:29

## 2019-07-06 RX ADMIN — RAMIPRIL SCH MG: 5 CAPSULE ORAL at 09:29

## 2019-07-06 RX ADMIN — OXYCODONE HYDROCHLORIDE AND ACETAMINOPHEN SCH MG: 500 TABLET ORAL at 09:29

## 2019-07-06 RX ADMIN — METOPROLOL TARTRATE SCH MG: 25 TABLET, FILM COATED ORAL at 09:29

## 2019-07-06 RX ADMIN — HYDRALAZINE HYDROCHLORIDE SCH MG: 25 TABLET, FILM COATED ORAL at 06:21

## 2019-07-06 RX ADMIN — INSULIN ASPART SCH UNITS: 100 INJECTION, SOLUTION INTRAVENOUS; SUBCUTANEOUS at 10:23

## 2019-07-06 RX ADMIN — FOLIC ACID SCH MG: 1 TABLET ORAL at 09:28

## 2019-07-06 NOTE — DS
Physical Examination


Vital Signs: 


 Vital Signs











Temperature  98 F   07/06/19 08:00


 


Pulse Rate  55 L  07/06/19 08:00


 


Respiratory Rate  16   07/06/19 08:00


 


Blood Pressure  175/53 H  07/06/19 08:00


 


O2 Sat by Pulse Oximetry (%)  96   07/05/19 21:00











Labs: 


 CBC, BMP





 07/01/19 05:15 





 07/05/19 14:08 











Discharge Summary


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


Current Active Problems





CHF exacerbation (Acute)








Condition: Improved





- Instructions


Disposition: HOME





- Home Medications


Comprehensive Discharge Medication List: 


Ambulatory Orders





Atorvastatin Ca [Lipitor] 40 mg PO HS 07/22/16 


Spironolactone [Aldactone -] 25 mg PO DAILY  tablet 08/22/17 


Ascorbic Acid [Vitamin C -] 500 mg PO DAILY 06/30/19 


Aspirin Coated [Ecotrin -] 81 mg PO DAILY 06/30/19 


Calcium Carb, Citrate/Vit D3 [Calcium + D3 ER Tablet] 1 each PO DAILY 06/30/19 


Donepezil HCl 5 mg PO DAILY 06/30/19 


Folic Acid 0.4 mg PO DAILY 06/30/19 


Insulin Lispro [Humalog] 72 unit SQ DAILY 06/30/19 


Metoprolol Tartrate [Lopressor -] 25 mg PO BID 06/30/19 


Furosemide [Lasix] 40 mg PO DAILY #30 tablet 07/06/19 


Ramipril [Altace] 10 mg PO DAILY #30 capsule 07/06/19 


Ranolazine [Ranexa -] 500 mg PO BID #60 tab 07/06/19 


hydrALAZINE HCL [Apresoline -] 100 mg PO TID #90 tablet 07/06/19